# Patient Record
Sex: MALE | Race: WHITE | Employment: OTHER | URBAN - METROPOLITAN AREA
[De-identification: names, ages, dates, MRNs, and addresses within clinical notes are randomized per-mention and may not be internally consistent; named-entity substitution may affect disease eponyms.]

---

## 2018-01-22 ENCOUNTER — HOSPITAL ENCOUNTER (INPATIENT)
Age: 78
LOS: 1 days | Discharge: NURSING, REHAB OR LONG-TERM CARE | DRG: 065 | End: 2018-01-25
Attending: EMERGENCY MEDICINE | Admitting: FAMILY MEDICINE
Payer: MEDICARE

## 2018-01-22 ENCOUNTER — HOSPITAL ENCOUNTER (EMERGENCY)
Age: 78
Discharge: ANOTHER HOSPITAL | End: 2018-01-22
Payer: MEDICARE

## 2018-01-22 ENCOUNTER — APPOINTMENT (OUTPATIENT)
Dept: CT IMAGING | Age: 78
End: 2018-01-22
Payer: MEDICARE

## 2018-01-22 ENCOUNTER — APPOINTMENT (OUTPATIENT)
Dept: MRI IMAGING | Age: 78
DRG: 065 | End: 2018-01-22
Payer: MEDICARE

## 2018-01-22 DIAGNOSIS — R42 DIZZINESS: Primary | ICD-10-CM

## 2018-01-22 DIAGNOSIS — G45.9 TRANSIENT CEREBRAL ISCHEMIA, UNSPECIFIED TYPE: ICD-10-CM

## 2018-01-22 LAB
ABSOLUTE EOS #: 0 K/UL (ref 0–0.4)
ABSOLUTE IMMATURE GRANULOCYTE: ABNORMAL K/UL (ref 0–0.3)
ABSOLUTE LYMPH #: 1.1 K/UL (ref 1–4.8)
ABSOLUTE MONO #: 0.5 K/UL (ref 0.1–1.3)
ALBUMIN SERPL-MCNC: 3.5 G/DL (ref 3.5–5.2)
ALBUMIN/GLOBULIN RATIO: ABNORMAL (ref 1–2.5)
ALP BLD-CCNC: 84 U/L (ref 40–129)
ALT SERPL-CCNC: 18 U/L (ref 5–41)
ANION GAP SERPL CALCULATED.3IONS-SCNC: 12 MMOL/L (ref 9–17)
AST SERPL-CCNC: 22 U/L
BASOPHILS # BLD: 0 % (ref 0–2)
BASOPHILS ABSOLUTE: 0 K/UL (ref 0–0.2)
BILIRUB SERPL-MCNC: 0.52 MG/DL (ref 0.3–1.2)
BILIRUBIN URINE: NEGATIVE
BUN BLDV-MCNC: 17 MG/DL (ref 8–23)
BUN/CREAT BLD: ABNORMAL (ref 9–20)
CALCIUM SERPL-MCNC: 8.7 MG/DL (ref 8.6–10.4)
CHLORIDE BLD-SCNC: 94 MMOL/L (ref 98–107)
CHOLESTEROL/HDL RATIO: 3.5
CHOLESTEROL: 170 MG/DL
CO2: 24 MMOL/L (ref 20–31)
COLOR: YELLOW
COMMENT UA: NORMAL
CREAT SERPL-MCNC: 1.35 MG/DL (ref 0.7–1.2)
DIFFERENTIAL TYPE: ABNORMAL
EKG ATRIAL RATE: 63 BPM
EKG P AXIS: 23 DEGREES
EKG P-R INTERVAL: 214 MS
EKG Q-T INTERVAL: 420 MS
EKG QRS DURATION: 94 MS
EKG QTC CALCULATION (BAZETT): 429 MS
EKG R AXIS: 51 DEGREES
EKG T AXIS: 64 DEGREES
EKG VENTRICULAR RATE: 63 BPM
EOSINOPHILS RELATIVE PERCENT: 1 % (ref 0–4)
GFR AFRICAN AMERICAN: >60 ML/MIN
GFR NON-AFRICAN AMERICAN: 51 ML/MIN
GFR SERPL CREATININE-BSD FRML MDRD: ABNORMAL ML/MIN/{1.73_M2}
GFR SERPL CREATININE-BSD FRML MDRD: ABNORMAL ML/MIN/{1.73_M2}
GLUCOSE BLD-MCNC: 134 MG/DL (ref 70–99)
GLUCOSE URINE: NEGATIVE
HCT VFR BLD CALC: 41.1 % (ref 41–53)
HDLC SERPL-MCNC: 49 MG/DL
HEMOGLOBIN: 13.8 G/DL (ref 13.5–17.5)
IMMATURE GRANULOCYTES: ABNORMAL %
KETONES, URINE: NEGATIVE
LDL CHOLESTEROL: 109 MG/DL (ref 0–130)
LEUKOCYTE ESTERASE, URINE: NEGATIVE
LYMPHOCYTES # BLD: 18 % (ref 24–44)
MCH RBC QN AUTO: 32.9 PG (ref 26–34)
MCHC RBC AUTO-ENTMCNC: 33.6 G/DL (ref 31–37)
MCV RBC AUTO: 97.9 FL (ref 80–100)
MONOCYTES # BLD: 7 % (ref 1–7)
NITRITE, URINE: NEGATIVE
NRBC AUTOMATED: ABNORMAL PER 100 WBC
PDW BLD-RTO: 14.7 % (ref 11.5–14.9)
PH UA: 6.5 (ref 5–8)
PLATELET # BLD: 243 K/UL (ref 150–450)
PLATELET ESTIMATE: ABNORMAL
PMV BLD AUTO: 7.3 FL (ref 6–12)
POTASSIUM SERPL-SCNC: 4.5 MMOL/L (ref 3.7–5.3)
PROTEIN UA: NEGATIVE
RBC # BLD: 4.2 M/UL (ref 4.5–5.9)
RBC # BLD: ABNORMAL 10*6/UL
SEG NEUTROPHILS: 74 % (ref 36–66)
SEGMENTED NEUTROPHILS ABSOLUTE COUNT: 4.9 K/UL (ref 1.3–9.1)
SODIUM BLD-SCNC: 130 MMOL/L (ref 135–144)
SPECIFIC GRAVITY UA: 1.01 (ref 1–1.03)
THYROXINE, FREE: 1.32 NG/DL (ref 0.93–1.7)
TOTAL PROTEIN: 6.9 G/DL (ref 6.4–8.3)
TRIGL SERPL-MCNC: 60 MG/DL
TSH SERPL DL<=0.05 MIU/L-ACNC: 0.58 MIU/L (ref 0.3–5)
TURBIDITY: CLEAR
URINE HGB: NEGATIVE
UROBILINOGEN, URINE: NORMAL
VLDLC SERPL CALC-MCNC: NORMAL MG/DL (ref 1–30)
WBC # BLD: 6.6 K/UL (ref 3.5–11)
WBC # BLD: ABNORMAL 10*3/UL

## 2018-01-22 PROCEDURE — 99999 PR OFFICE/OUTPT VISIT,PROCEDURE ONLY: CPT | Performed by: PSYCHIATRY & NEUROLOGY

## 2018-01-22 PROCEDURE — 93005 ELECTROCARDIOGRAM TRACING: CPT

## 2018-01-22 PROCEDURE — 96372 THER/PROPH/DIAG INJ SC/IM: CPT

## 2018-01-22 PROCEDURE — 80053 COMPREHEN METABOLIC PANEL: CPT

## 2018-01-22 PROCEDURE — 84443 ASSAY THYROID STIM HORMONE: CPT

## 2018-01-22 PROCEDURE — G0378 HOSPITAL OBSERVATION PER HR: HCPCS

## 2018-01-22 PROCEDURE — 2580000003 HC RX 258: Performed by: FAMILY MEDICINE

## 2018-01-22 PROCEDURE — 6360000002 HC RX W HCPCS: Performed by: FAMILY MEDICINE

## 2018-01-22 PROCEDURE — 2580000003 HC RX 258: Performed by: EMERGENCY MEDICINE

## 2018-01-22 PROCEDURE — 94664 DEMO&/EVAL PT USE INHALER: CPT

## 2018-01-22 PROCEDURE — 84439 ASSAY OF FREE THYROXINE: CPT

## 2018-01-22 PROCEDURE — 80047 BASIC METABLC PNL IONIZED CA: CPT

## 2018-01-22 PROCEDURE — 82746 ASSAY OF FOLIC ACID SERUM: CPT

## 2018-01-22 PROCEDURE — 85014 HEMATOCRIT: CPT

## 2018-01-22 PROCEDURE — 82607 VITAMIN B-12: CPT

## 2018-01-22 PROCEDURE — 36415 COLL VENOUS BLD VENIPUNCTURE: CPT

## 2018-01-22 PROCEDURE — 99285 EMERGENCY DEPT VISIT HI MDM: CPT

## 2018-01-22 PROCEDURE — 6370000000 HC RX 637 (ALT 250 FOR IP): Performed by: EMERGENCY MEDICINE

## 2018-01-22 PROCEDURE — 85025 COMPLETE CBC W/AUTO DIFF WBC: CPT

## 2018-01-22 PROCEDURE — 80061 LIPID PANEL: CPT

## 2018-01-22 PROCEDURE — 81003 URINALYSIS AUTO W/O SCOPE: CPT

## 2018-01-22 PROCEDURE — 70450 CT HEAD/BRAIN W/O DYE: CPT

## 2018-01-22 RX ORDER — BUDESONIDE AND FORMOTEROL FUMARATE DIHYDRATE 160; 4.5 UG/1; UG/1
2 AEROSOL RESPIRATORY (INHALATION) 2 TIMES DAILY
Status: DISCONTINUED | OUTPATIENT
Start: 2018-01-22 | End: 2018-01-25 | Stop reason: HOSPADM

## 2018-01-22 RX ORDER — OMEPRAZOLE 20 MG/1
20 CAPSULE, DELAYED RELEASE ORAL DAILY
COMMUNITY

## 2018-01-22 RX ORDER — MELOXICAM 15 MG/1
15 TABLET ORAL DAILY
Status: ON HOLD | COMMUNITY
End: 2018-02-02 | Stop reason: HOSPADM

## 2018-01-22 RX ORDER — GABAPENTIN 300 MG/1
300 CAPSULE ORAL 2 TIMES DAILY
Status: DISCONTINUED | OUTPATIENT
Start: 2018-01-23 | End: 2018-01-25 | Stop reason: HOSPADM

## 2018-01-22 RX ORDER — SODIUM CHLORIDE 0.9 % (FLUSH) 0.9 %
10 SYRINGE (ML) INJECTION PRN
Status: DISCONTINUED | OUTPATIENT
Start: 2018-01-22 | End: 2018-01-22 | Stop reason: SDUPTHER

## 2018-01-22 RX ORDER — BUDESONIDE AND FORMOTEROL FUMARATE DIHYDRATE 160; 4.5 UG/1; UG/1
2 AEROSOL RESPIRATORY (INHALATION) 2 TIMES DAILY
COMMUNITY

## 2018-01-22 RX ORDER — LUTEIN 6 MG
20 TABLET ORAL
Status: ON HOLD | COMMUNITY
End: 2018-02-02 | Stop reason: HOSPADM

## 2018-01-22 RX ORDER — GABAPENTIN 300 MG/1
300 CAPSULE ORAL 3 TIMES DAILY
Status: DISCONTINUED | OUTPATIENT
Start: 2018-01-22 | End: 2018-01-22

## 2018-01-22 RX ORDER — ACETAMINOPHEN 325 MG/1
650 TABLET ORAL EVERY 4 HOURS PRN
Status: DISCONTINUED | OUTPATIENT
Start: 2018-01-22 | End: 2018-01-22 | Stop reason: SDUPTHER

## 2018-01-22 RX ORDER — GABAPENTIN 300 MG/1
300 CAPSULE ORAL 2 TIMES DAILY
COMMUNITY

## 2018-01-22 RX ORDER — SODIUM CHLORIDE 0.9 % (FLUSH) 0.9 %
10 SYRINGE (ML) INJECTION EVERY 12 HOURS SCHEDULED
Status: DISCONTINUED | OUTPATIENT
Start: 2018-01-22 | End: 2018-01-25 | Stop reason: HOSPADM

## 2018-01-22 RX ORDER — CARVEDILOL 12.5 MG/1
12.5 TABLET ORAL 2 TIMES DAILY WITH MEALS
Status: DISCONTINUED | OUTPATIENT
Start: 2018-01-22 | End: 2018-01-22

## 2018-01-22 RX ORDER — 0.9 % SODIUM CHLORIDE 0.9 %
500 INTRAVENOUS SOLUTION INTRAVENOUS ONCE
Status: COMPLETED | OUTPATIENT
Start: 2018-01-22 | End: 2018-01-22

## 2018-01-22 RX ORDER — CARVEDILOL 25 MG/1
25 TABLET ORAL 2 TIMES DAILY WITH MEALS
Status: DISCONTINUED | OUTPATIENT
Start: 2018-01-23 | End: 2018-01-22

## 2018-01-22 RX ORDER — FERROUS SULFATE 325(65) MG
325 TABLET ORAL
COMMUNITY

## 2018-01-22 RX ORDER — CARVEDILOL 12.5 MG/1
25 TABLET ORAL 2 TIMES DAILY WITH MEALS
Status: ON HOLD | COMMUNITY
End: 2018-02-02 | Stop reason: HOSPADM

## 2018-01-22 RX ORDER — MELOXICAM 15 MG/1
15 TABLET ORAL DAILY
Status: DISCONTINUED | OUTPATIENT
Start: 2018-01-22 | End: 2018-01-22

## 2018-01-22 RX ORDER — SODIUM CHLORIDE 0.9 % (FLUSH) 0.9 %
10 SYRINGE (ML) INJECTION EVERY 12 HOURS SCHEDULED
Status: DISCONTINUED | OUTPATIENT
Start: 2018-01-22 | End: 2018-01-22 | Stop reason: SDUPTHER

## 2018-01-22 RX ORDER — SODIUM CHLORIDE 0.9 % (FLUSH) 0.9 %
10 SYRINGE (ML) INJECTION PRN
Status: DISCONTINUED | OUTPATIENT
Start: 2018-01-22 | End: 2018-01-25 | Stop reason: HOSPADM

## 2018-01-22 RX ORDER — ASPIRIN 81 MG/1
324 TABLET, CHEWABLE ORAL ONCE
Status: COMPLETED | OUTPATIENT
Start: 2018-01-22 | End: 2018-01-22

## 2018-01-22 RX ORDER — MELOXICAM 15 MG/1
15 TABLET ORAL DAILY
Status: DISCONTINUED | OUTPATIENT
Start: 2018-01-23 | End: 2018-01-25 | Stop reason: HOSPADM

## 2018-01-22 RX ORDER — ASPIRIN 81 MG/1
81 TABLET, CHEWABLE ORAL DAILY
Status: ON HOLD | COMMUNITY
End: 2018-02-02 | Stop reason: HOSPADM

## 2018-01-22 RX ORDER — PANTOPRAZOLE SODIUM 40 MG/1
40 TABLET, DELAYED RELEASE ORAL
Status: DISCONTINUED | OUTPATIENT
Start: 2018-01-23 | End: 2018-01-22

## 2018-01-22 RX ORDER — LUTEIN 6 MG
20 TABLET ORAL 2 TIMES DAILY
Status: DISCONTINUED | OUTPATIENT
Start: 2018-01-22 | End: 2018-01-22 | Stop reason: ALTCHOICE

## 2018-01-22 RX ORDER — OMEPRAZOLE 20 MG/1
20 CAPSULE, DELAYED RELEASE ORAL DAILY
Status: DISCONTINUED | OUTPATIENT
Start: 2018-01-23 | End: 2018-01-25 | Stop reason: HOSPADM

## 2018-01-22 RX ORDER — CARVEDILOL 25 MG/1
25 TABLET ORAL 2 TIMES DAILY WITH MEALS
Status: DISCONTINUED | OUTPATIENT
Start: 2018-01-22 | End: 2018-01-22

## 2018-01-22 RX ORDER — CARVEDILOL 25 MG/1
25 TABLET ORAL 2 TIMES DAILY WITH MEALS
Status: DISCONTINUED | OUTPATIENT
Start: 2018-01-22 | End: 2018-01-25 | Stop reason: HOSPADM

## 2018-01-22 RX ORDER — ACETAMINOPHEN 325 MG/1
650 TABLET ORAL EVERY 4 HOURS PRN
Status: DISCONTINUED | OUTPATIENT
Start: 2018-01-22 | End: 2018-01-25 | Stop reason: HOSPADM

## 2018-01-22 RX ORDER — SODIUM CHLORIDE 9 MG/ML
INJECTION, SOLUTION INTRAVENOUS CONTINUOUS
Status: DISCONTINUED | OUTPATIENT
Start: 2018-01-22 | End: 2018-01-24

## 2018-01-22 RX ORDER — ONDANSETRON 2 MG/ML
4 INJECTION INTRAMUSCULAR; INTRAVENOUS EVERY 6 HOURS PRN
Status: DISCONTINUED | OUTPATIENT
Start: 2018-01-22 | End: 2018-01-25 | Stop reason: HOSPADM

## 2018-01-22 RX ORDER — FERROUS SULFATE 325(65) MG
325 TABLET ORAL
Status: DISCONTINUED | OUTPATIENT
Start: 2018-01-23 | End: 2018-01-23 | Stop reason: CLARIF

## 2018-01-22 RX ORDER — ASPIRIN 81 MG/1
81 TABLET, CHEWABLE ORAL DAILY
Status: DISCONTINUED | OUTPATIENT
Start: 2018-01-22 | End: 2018-01-23 | Stop reason: CLARIF

## 2018-01-22 RX ADMIN — ENOXAPARIN SODIUM 40 MG: 40 INJECTION SUBCUTANEOUS at 15:39

## 2018-01-22 RX ADMIN — SODIUM CHLORIDE: 9 INJECTION, SOLUTION INTRAVENOUS at 15:28

## 2018-01-22 RX ADMIN — SODIUM CHLORIDE 500 ML: 9 INJECTION, SOLUTION INTRAVENOUS at 11:39

## 2018-01-22 RX ADMIN — ASPIRIN 81 MG 324 MG: 81 TABLET ORAL at 12:55

## 2018-01-22 RX ADMIN — BUDESONIDE AND FORMOTEROL FUMARATE DIHYDRATE 2 PUFF: 160; 4.5 AEROSOL RESPIRATORY (INHALATION) at 21:43

## 2018-01-22 RX ADMIN — CARVEDILOL 25 MG: 25 TABLET ORAL at 21:49

## 2018-01-22 ASSESSMENT — ENCOUNTER SYMPTOMS
COUGH: 0
EYE REDNESS: 0
DIARRHEA: 0
VOMITING: 0
EYE PAIN: 0
EYE DISCHARGE: 0
BACK PAIN: 0
ABDOMINAL PAIN: 0
RHINORRHEA: 0
SHORTNESS OF BREATH: 0

## 2018-01-22 NOTE — PROGRESS NOTES
RN called and spoke with Dr. Jarett Chowdary in regards to home medications. Coreg and gabapentin were adjusted to the doses and frequencies that the patient has been taking at home.

## 2018-01-22 NOTE — ED NOTES
1/22/2018 2:12 PM    Patient: Randal Rodriguez, 76 y.o., male Race:[place]  Patient Address: [place] [address]  Incident Address:  []Same as patient address. [address] [facility]. [address] [facility] [place] [address]   [x] [facility]  [] [facility]  [] [facility] [place]   [] TIFFANY VALENZUELA JR. [facility]  [] Counts include 234 beds at the [facility]  Patient Phone #: [phone]   Insurance: Payor: Gilbert Danielson /  /  /   Nayan Randolph:  []  Yes   [x]  No  Emergency Contact: Extended Emergency Contact Information  Primary Emergency Contact: LynLisadalton  Address: [address] [facility], [address] [place] [address] Phone: [phone]  Relation: Child  Secondary Emergency Contact: RomulosandraGuillermo   [address] Phone: [phone]  Relation: Child  MRN: [id number]  [facility]# [id number]  YOB: 1940  Primary Care Physician: Eduardo Singer DO  Advance Directive: [x] Full Code   []DNR-CC   []DNR-CCA    [facility] Crew: Julianne Gamez - Paramedic,  Charity Gonzales - ALICIA    Pt Transported To:  [] [place] [address]  [] [facility]  [x] [facility]  [] [facility]  [] [facility]  [] [facility]  [facility] [] [place]     Was patient transported to closest facility? [x]Yes  []No (if No specify reason)   []   Patient/family request   []   Divert to specialist       Response Code   [] 2  [x] 3  []   Change  [] 2  [] 3   Transport Code   [x] 2  [] 3  []   Change  [] 2  [] 3     Call Received 4920   Dispatched    Arrived Scene 97 [id number]   At Patient 1932   Decision to [address] [facility]   Scan [phone]   Departed Scene [facility] 1005   Departed Hospital 1026   In Service 1026         Allergies  is allergic to latex. Medications  Prior to Admission medications    Medication Sig Start Date End Date Taking?  Authorizing Provider   aspirin 81 MG chewable tablet Take 81 mg by mouth daily    Historical Provider, MD   carvedilol (COREG) 12.5 MG tablet Take 12.5 mg by mouth 2 times daily (with meals)    Historical Provider, MD   Roflumilast (DALIRESP) 500 MCG tablet Take 500 DO    Hospital Team Alert:   []    Trauma Alert    []    STEMI Alert    []    RACE Alert      Description Of Valuables:     Patient Valuables:   []    With Patient    []    Not Recieved    []    ER / Lab     Call Outcome:   [x]    Transport to Facility    []    Care Transferred to [facility]    []    Cancelled    []    Patient Refusal    []          Cancellations:   []    D. O.A.     []    No Contact    []    Signed Refusal    []    Treat and Release    []    Ginny Cooper  Mobile Stroke Unit    Electronically signed by Marco Antonio aRy RN on 1/22/18 at 2:12 PM     Marco Antonio Ray RN  01/22/18 8153

## 2018-01-22 NOTE — ED PROVIDER NOTES
[address]  eMERGENCY dEPARTMENT eNCOUnter      Pt Name: Donna Lester  MRN: [id number]  Birthdate 1940  Date of evaluation: 1/22/2018  PCP:    Calixto Saleh DO      CHIEF COMPLAINT       Chief Complaint   Patient presents with    Altered Mental Status         HISTORY OF PRESENT ILLNESS   HPI   Donna Lester is a 76 y.o. male who presents For evaluation for a dizzy episode. Patient states that he was walking and started feeling little bit dizzy. He states has happened a few times in the last month. He states right now he feels fine. He was brought in by the stroke team after having a negative CT. Patient again states he is back to his baseline. He feels fine. He states is again happens once in a while. Has no specific complaints otherwise. REVIEW OF SYSTEMS         Review of Systems   Constitutional: Negative for chills and fever. HENT: Negative for congestion and rhinorrhea. Eyes: Negative for pain, discharge and redness. Respiratory: Negative for cough and shortness of breath. Cardiovascular: Negative for chest pain. Gastrointestinal: Negative for abdominal pain, diarrhea and vomiting. Genitourinary: Negative for dysuria and hematuria. Musculoskeletal: Negative for arthralgias, back pain, myalgias, neck pain and neck stiffness. Skin: Negative for rash. Neurological: Positive for dizziness. Negative for light-headedness and headaches. Hematological: Does not bruise/bleed easily.           PAST MEDICAL HISTORY     Past Medical History:   Diagnosis Date    Hypertension        SURGICAL HISTORY       Past Surgical History:   Procedure Laterality Date    ABDOMINAL AORTIC ANEURYSM REPAIR      COLECTOMY         CURRENT MEDICATIONS       Previous Medications    ASPIRIN 81 MG CHEWABLE TABLET    Take 81 mg by mouth daily    BUDESONIDE-FORMOTEROL (SYMBICORT) 160-4.5 MCG/ACT AERO    Inhale 2 puffs into the lungs 2 times daily    CARVEDILOL (COREG) 12.5 MG TABLET    Take 12.5 mg by mouth 2 times daily (with meals)    FERROUS SULFATE 325 (65 FE) MG TABLET    Take 325 mg by mouth daily (with breakfast)    GABAPENTIN (NEURONTIN) 300 MG CAPSULE    Take 300 mg by mouth 3 times daily. LUTEIN 20 MG TABS    Take 20 mg/day by mouth    MELOXICAM (MOBIC) 15 MG TABLET    Take 15 mg by mouth daily    OMEPRAZOLE (PRILOSEC) 20 MG DELAYED RELEASE CAPSULE    Take 20 mg by mouth Daily    ROFLUMILAST (DALIRESP) 500 MCG TABLET    Take 500 mcg by mouth daily    TIOTROPIUM (SPIRIVA) 18 MCG INHALATION CAPSULE    Inhale 18 mcg into the lungs daily       ALLERGIES     Latex    FAMILY HISTORY       No family status information on file. History reviewed. No pertinent family history. SOCIAL HISTORY       Social History     Social History    Marital status:      Spouse name: N/A    Number of children: N/A    Years of education: N/A     Social History Main Topics    Smoking status: Former Smoker    Smokeless tobacco: Former User    Alcohol use Yes    Drug use: No    Sexual activity: Not Asked     Other Topics Concern    None     Social History Narrative    None       PHYSICAL EXAM     INITIAL VITALS:  weight is 218 lb (98.9 kg). His oral temperature is 98.2 °F (36.8 °C). His blood pressure is 131/75 and his pulse is 68. His respiration is 19 and oxygen saturation is 95%. Physical Exam   Constitutional: He is oriented to person, place, and time. He appears well-developed and well-nourished. HENT:   Head: Normocephalic and atraumatic. Mouth/Throat: Oropharynx is clear and moist.   Eyes: Conjunctivae and EOM are normal. Pupils are equal, round, and reactive to light. Right eye exhibits no discharge. Left eye exhibits no discharge. Neck: Normal range of motion. Neck supple. Cardiovascular: Normal rate, regular rhythm, normal heart sounds and intact distal pulses. Pulmonary/Chest: Effort normal and breath sounds normal. No respiratory distress.  He has no

## 2018-01-22 NOTE — ED NOTES
Encouraged pt for urine sample, pt states he doesn't have the urge and doesn't want to try.  Will give pt time to recieve fluids and will try again      Tana Merritt RN  01/22/18 8163

## 2018-01-23 ENCOUNTER — APPOINTMENT (OUTPATIENT)
Dept: MRI IMAGING | Age: 78
DRG: 065 | End: 2018-01-23
Payer: MEDICARE

## 2018-01-23 LAB
ABSOLUTE EOS #: 0 K/UL (ref 0–0.4)
ABSOLUTE IMMATURE GRANULOCYTE: ABNORMAL K/UL (ref 0–0.3)
ABSOLUTE LYMPH #: 1.2 K/UL (ref 1–4.8)
ABSOLUTE MONO #: 0.5 K/UL (ref 0.1–1.3)
ANION GAP SERPL CALCULATED.3IONS-SCNC: 9 MMOL/L (ref 9–17)
ANION GAP: 18 MMOL/L (ref 8–16)
BASOPHILS # BLD: 1 % (ref 0–2)
BASOPHILS ABSOLUTE: 0 K/UL (ref 0–0.2)
BUN BLDV-MCNC: 15 MG/DL (ref 8–23)
BUN/CREAT BLD: ABNORMAL (ref 9–20)
CALCIUM SERPL-MCNC: 8.8 MG/DL (ref 8.6–10.4)
CHLORIDE BLD-SCNC: 98 MMOL/L (ref 98–107)
CO2: 26 MMOL/L (ref 20–31)
CREAT SERPL-MCNC: 0.99 MG/DL (ref 0.7–1.2)
DIFFERENTIAL TYPE: ABNORMAL
EOSINOPHILS RELATIVE PERCENT: 1 % (ref 0–4)
FOLATE: 19.1 NG/ML
GFR AFRICAN AMERICAN: >60 ML/MIN
GFR NON-AFRICAN AMERICAN: >60 ML/MIN
GFR SERPL CREATININE-BSD FRML MDRD: ABNORMAL ML/MIN/{1.73_M2}
GFR SERPL CREATININE-BSD FRML MDRD: ABNORMAL ML/MIN/{1.73_M2}
GLUCOSE BLD-MCNC: 114 MG/DL (ref 70–99)
GLUCOSE BLD-MCNC: 135 MG/DL (ref 74–106)
HCT VFR BLD CALC: 39.5 % (ref 41–53)
HEMOGLOBIN: 13.5 G/DL (ref 13.5–17.5)
IMMATURE GRANULOCYTES: ABNORMAL %
LYMPHOCYTES # BLD: 22 % (ref 24–44)
MCH RBC QN AUTO: 33 PG (ref 26–34)
MCHC RBC AUTO-ENTMCNC: 34.1 G/DL (ref 31–37)
MCV RBC AUTO: 96.9 FL (ref 80–100)
MONOCYTES # BLD: 9 % (ref 1–7)
NRBC AUTOMATED: ABNORMAL PER 100 WBC
PDW BLD-RTO: 14.6 % (ref 11.5–14.9)
PLATELET # BLD: 222 K/UL (ref 150–450)
PLATELET ESTIMATE: ABNORMAL
PMV BLD AUTO: 7.1 FL (ref 6–12)
POC BUN: 19 MG/DL (ref 6–20)
POC CHLORIDE: 95 MMOL/L (ref 98–110)
POC CREATININE: 1.3 MG/DL (ref 0.6–1.4)
POC HEMATOCRIT: 41 % (ref 41–53)
POC HEMOGLOBIN: 13.9 G/DL (ref 13.5–17.5)
POC IONIZED CALCIUM: 1.19 MMOL/L (ref 1.13–1.33)
POC POTASSIUM: 4.5 MMOL/L (ref 3.5–5.1)
POC SODIUM: 133 MMOL/L (ref 136–145)
POC TCO2: 25 MMOL/L (ref 20–31)
POTASSIUM SERPL-SCNC: 4.5 MMOL/L (ref 3.7–5.3)
RBC # BLD: 4.08 M/UL (ref 4.5–5.9)
RBC # BLD: ABNORMAL 10*6/UL
SEG NEUTROPHILS: 67 % (ref 36–66)
SEGMENTED NEUTROPHILS ABSOLUTE COUNT: 3.7 K/UL (ref 1.3–9.1)
SODIUM BLD-SCNC: 133 MMOL/L (ref 135–144)
VITAMIN B-12: 254 PG/ML (ref 232–1245)
WBC # BLD: 5.5 K/UL (ref 3.5–11)
WBC # BLD: ABNORMAL 10*3/UL

## 2018-01-23 PROCEDURE — 97165 OT EVAL LOW COMPLEX 30 MIN: CPT

## 2018-01-23 PROCEDURE — 6360000002 HC RX W HCPCS: Performed by: FAMILY MEDICINE

## 2018-01-23 PROCEDURE — 70551 MRI BRAIN STEM W/O DYE: CPT

## 2018-01-23 PROCEDURE — 96374 THER/PROPH/DIAG INJ IV PUSH: CPT

## 2018-01-23 PROCEDURE — 85025 COMPLETE CBC W/AUTO DIFF WBC: CPT

## 2018-01-23 PROCEDURE — 97161 PT EVAL LOW COMPLEX 20 MIN: CPT

## 2018-01-23 PROCEDURE — 97530 THERAPEUTIC ACTIVITIES: CPT

## 2018-01-23 PROCEDURE — 93880 EXTRACRANIAL BILAT STUDY: CPT

## 2018-01-23 PROCEDURE — G8987 SELF CARE CURRENT STATUS: HCPCS

## 2018-01-23 PROCEDURE — G8978 MOBILITY CURRENT STATUS: HCPCS

## 2018-01-23 PROCEDURE — G0378 HOSPITAL OBSERVATION PER HR: HCPCS

## 2018-01-23 PROCEDURE — 80048 BASIC METABOLIC PNL TOTAL CA: CPT

## 2018-01-23 PROCEDURE — G8979 MOBILITY GOAL STATUS: HCPCS

## 2018-01-23 PROCEDURE — 36415 COLL VENOUS BLD VENIPUNCTURE: CPT

## 2018-01-23 PROCEDURE — 2580000003 HC RX 258: Performed by: FAMILY MEDICINE

## 2018-01-23 PROCEDURE — G8988 SELF CARE GOAL STATUS: HCPCS

## 2018-01-23 PROCEDURE — 96372 THER/PROPH/DIAG INJ SC/IM: CPT

## 2018-01-23 RX ORDER — LORAZEPAM 2 MG/ML
1 INJECTION INTRAMUSCULAR ONCE
Status: COMPLETED | OUTPATIENT
Start: 2018-01-23 | End: 2018-01-23

## 2018-01-23 RX ORDER — FERROUS SULFATE 325(65) MG
325 TABLET ORAL
Status: DISCONTINUED | OUTPATIENT
Start: 2018-01-24 | End: 2018-01-25 | Stop reason: HOSPADM

## 2018-01-23 RX ORDER — ASPIRIN 81 MG/1
81 TABLET ORAL DAILY
Status: DISCONTINUED | OUTPATIENT
Start: 2018-01-23 | End: 2018-01-25 | Stop reason: HOSPADM

## 2018-01-23 RX ADMIN — LORAZEPAM 1 MG: 2 INJECTION INTRAMUSCULAR; INTRAVENOUS at 17:57

## 2018-01-23 RX ADMIN — BUDESONIDE AND FORMOTEROL FUMARATE DIHYDRATE 2 PUFF: 160; 4.5 AEROSOL RESPIRATORY (INHALATION) at 21:42

## 2018-01-23 RX ADMIN — ENOXAPARIN SODIUM 40 MG: 40 INJECTION SUBCUTANEOUS at 10:10

## 2018-01-23 RX ADMIN — BUDESONIDE AND FORMOTEROL FUMARATE DIHYDRATE 2 PUFF: 160; 4.5 AEROSOL RESPIRATORY (INHALATION) at 09:51

## 2018-01-23 RX ADMIN — CARVEDILOL 25 MG: 25 TABLET ORAL at 18:02

## 2018-01-23 RX ADMIN — ASPIRIN 81 MG: 81 TABLET ORAL at 13:19

## 2018-01-23 RX ADMIN — SODIUM CHLORIDE: 9 INJECTION, SOLUTION INTRAVENOUS at 16:15

## 2018-01-23 RX ADMIN — SODIUM CHLORIDE: 9 INJECTION, SOLUTION INTRAVENOUS at 03:30

## 2018-01-23 RX ADMIN — MELOXICAM 15 MG: 15 TABLET ORAL at 09:55

## 2018-01-23 RX ADMIN — CARVEDILOL 25 MG: 25 TABLET ORAL at 09:55

## 2018-01-23 ASSESSMENT — PAIN SCALES - GENERAL: PAINLEVEL_OUTOF10: 7

## 2018-01-23 NOTE — PROGRESS NOTES
chair  Ambulation  Ambulation?: Yes  Ambulation 1  Surface: level tile  Device: Rolling Walker  Assistance: Contact guard assistance  Quality of Gait: steady gait in straight line but LOB with correctin x 2 with turning  Distance: 30ft x 2  Comments: pt gets too close to objects with walker- verbal cues for safety  Stairs/Curb  Stairs?: No     Balance  Sitting - Static: Good  Sitting - Dynamic: Good  Standing - Static: Fair  Comments: LOB backwards with eyse closed and with feet together/eyes open        Assessment   Body structures, Functions, Activity limitations: Decreased functional mobility ; Decreased balance;Decreased safe awareness  Assessment: Impaired mobility due to decreased balance and safety issues  Decision Making: Low Complexity  History: LE neuropathy  Exam: decreased balance  Clinical Presentation: clinical presentation is evolving  REQUIRES PT FOLLOW UP: Yes  PT Equipment Recommendations  Equipment Needed: No     Discharge Recommendations:  Reiseñor 75  Times per week: 7x/wk  Times per day: Daily  Safety Devices  Type of devices: Patient at risk for falls, Left in bed (US in for testing)    G-Code  PT G-Codes  Functional Assessment Tool Used: [place]  Score: 19  Functional Limitation: Mobility: Walking and moving around  Mobility: Walking and Moving Around Current Status (): At least 20 percent but less than 40 percent impaired, limited or restricted  Mobility: Walking and Moving Around Goal Status ():  At least 1 percent but less than 20 percent impaired, limited or restricted          Goals  Short term goals  Time Frame for Short term goals: 3-4 sessions  Short term goal 1: Mod-I transfers  Short term goal 2: Mod-I gait with RW x 150ft  Short term goal 3: Negotiate 4-6 steps mod-I with  rail(pt goes down backwards at home)  Patient Goals   Patient goals : go home       Therapy Time   Individual Concurrent Group Co-treatment   Time In 1000

## 2018-01-23 NOTE — PROGRESS NOTES
[facility]: ALEJA ARCHULETA   Occupational Therapy Evaluation  Date: 18  Patient Name: Kwasi Suarez       Room: 5178/4157-01  MRN: [id number]  Account: [de-identified]   : 1940  (73 y.o.) Gender: male     Referring Practitioner: Dr. Pedro Holland  Diagnosis: TIA    Treatment Diagnosis: Impaired self-care status  Past Medical History:  has a past medical history of Hypertension. Past Surgical History:   has a past surgical history that includes Abdominal aortic aneurysm repair; colectomy; joint replacement; and Carpal tunnel release (Bilateral). Restrictions  Restrictions/Precautions: Fall Risk  Implants present? : Metal implants (L TKA )  Required Braces or Orthoses?: No     Vitals  Temp: 98.1 °F (36.7 °C)  Pulse: 65  Resp: 20  BP: (!) 143/61  Height: 5' 10.5\" (179.1 cm)  Weight: 220 lb 0.3 oz (99.8 kg)  BMI (Calculated): 31.2  Oxygen Therapy  SpO2: 98 %  Pulse Oximeter Device Mode: Continuous  O2 Device: None (Room air)  Blood Pressure Lyin/78  Pulse Lyin PER MINUTE  Blood Pressure Sittin/69  Pulse Sittin PER MINUTE  Blood Pressure Standin/69  Pulse Standin PER MINUTE  Level of Consciousness: Alert    Subjective  Subjective: \"I don't know what is causing this\"  Comments: Pt reports that he has been having increased falls and LOB since November but does not know why.   Overall Orientation Status: Within Functional Limits  Vision  Vision: Impaired  Vision Exceptions: Wears glasses at all times  Hearing  Hearing: Exceptions to [facility]  Hearing Exceptions: Hard of hearing/hearing concerns, No hearing aid  Social/Functional History  Lives With: Son  Type of Home: House  Home Layout: Able to Live on Main level with bedroom/bathroom, Two level  Home Access: Ramped entrance, Stairs to enter with rails  Entrance Stairs - Number of Steps: 4  Bathroom Shower/Tub: Tub/Shower unit, Curtain  Bathroom Toilet: Handicap height  Bathroom Equipment: Grab bars in shower, Shower chair, [employer] bars around toilet, Hand-held shower  Bathroom Accessibility: [facility] accessible  Home Equipment: Rolling walker, Farrukh Cristel, Electric scooter  ADL Assistance: Independent  Homemaking Responsibilities: No (Son does cleaning/laundry; Pt eats out)  Ambulation Assistance: Independent  Transfer Assistance: Independent (uses cane or RW when having a \"bad day\")  Active : Yes (Daytime only)  Occupation: Retired  Additional Comments: son works during the day. pt has had multiple falls in the past 4-5 months    Objective  Vision - Basic Assessment  Patient Visual Report: No visual complaint reported. Cognition  Overall Cognitive Status: WFL   Perception  Overall Perceptual Status: WFL  Sensation  Overall Sensation Status: WFL (BUE)   ADL  Feeding: Independent  Grooming: Setup  UE Bathing: Setup  LE Bathing: Contact guard assistance  UE Dressing: Setup  LE Dressing: Contact guard assistance  Toileting: Contact guard assistance  Additional Comments: CGA for safety with standing tasks. UE Function  LUE Strength  Gross LUE Strength: WFL  L Hand Grasp: 5/5     LUE Tone: Normotonic     LUE AROM (degrees)  LUE AROM : WFL     Left Hand AROM (degrees)  Left Hand AROM: WFL  RUE Strength  Gross RUE Strength: WFL  R Hand Grasp: 5/5      RUE Tone: Normotonic     RUE AROM (degrees)  RUE AROM : WFL     Right Hand AROM (degrees)  Right Hand AROM: WFL    Fine Motor Skills  Coordination  Movements Are Fluid And Coordinated: Yes     Mobility  Sit to Supine: Stand by assistance   Stand Pivot Transfers: Contact guard assistance   Balance  Sitting Balance: Independent  Standing Balance: Contact guard assistance  Standing Balance  Sit to stand: Contact guard assistance  Stand to sit: Contact guard assistance  Comment: Pt tends to sit down hard in chair     Bed mobility  Sit to Supine: Stand by assistance  Comment: Per PT evaluation.      Transfers  Stand Step Transfers: Contact guard assistance  Stand Pivot Transfers: Contact Recommendations: Balance Training, Functional Mobility Training, Endurance Training, Safety Education & Training, Patient/Caregiver Education & Training, Equipment Evaluation, Education, & procurement, Self-Care / ADL    Equipment Recommendations  Equipment Needed:  (TBD)  OT Individual Minutes  Time In: 5267  Time Out: 1739  Minutes: 25    Electronically signed by Ruddy Meléndez on 1/23/18 at 2:32 PM

## 2018-01-23 NOTE — PROGRESS NOTES
Status   01/22/2018 NEGATIVE NEG Final     Bilirubin Urine   Date Value Ref Range Status   01/22/2018 NEGATIVE NEG Final       CULTURES:    Current Rehabilitation Assessments:  PHYSICAL THERAPY:     OCCUPATIONAL THERAPY:     SPEECH:      Assessment:  Active Problems:    TIA (transient ischemic attack)      Plan:  1.  MRI  2. ?inc /plavix??  3. Cont home meds    Katelynn Castellanos MD             1/23/2018, 7:23 AM

## 2018-01-23 NOTE — CONSULTS
Genitourinary: Negative for dysuria and hematuria. Musculoskeletal: Negative for arthralgias, back pain, myalgias, neck pain and neck stiffness. Skin: Negative for rash. Neurological: Positive for dizziness. Negative for light-headedness and headaches. Hematological: Does not bruise/bleed easily.               PHYSICAL EXAM:       /76   Pulse 72   Temp 99.1 °F (37.3 °C) (Oral)   Resp 20   Ht 5' 10.5\" (1.791 m)   Wt 220 lb 0.3 oz (99.8 kg)   SpO2 97%   BMI 31.12 kg/m²       LABS AND IMAGING:       Admission on 01/22/2018   Component Date Value Ref Range Status    WBC 01/22/2018 6.6  3.5 - 11.0 k/uL Final    RBC 01/22/2018 4.20* 4.5 - 5.9 m/uL Final    Hemoglobin 01/22/2018 13.8  13.5 - 17.5 g/dL Final    Hematocrit 01/22/2018 41.1  41 - 53 % Final    MCV 01/22/2018 97.9  80 - 100 fL Final    MCH 01/22/2018 32.9  26 - 34 pg Final    MCHC 01/22/2018 33.6  31 - 37 g/dL Final    RDW 01/22/2018 14.7  11.5 - 14.9 % Final    Platelets 88/24/1693 243  150 - 450 k/uL Final    MPV 01/22/2018 7.3  6.0 - 12.0 fL Final    NRBC Automated 01/22/2018 NOT REPORTED  per 100 WBC Final    Differential Type 01/22/2018 NOT REPORTED   Final    Seg Neutrophils 01/22/2018 74* 36 - 66 % Final    Lymphocytes 01/22/2018 18* 24 - 44 % Final    Monocytes 01/22/2018 7  1 - 7 % Final    Eosinophils % 01/22/2018 1  0 - 4 % Final    Basophils 01/22/2018 0  0 - 2 % Final    Immature Granulocytes 01/22/2018 NOT REPORTED  0 % Final    Segs Absolute 01/22/2018 4.90  1.3 - 9.1 k/uL Final    Absolute Lymph # 01/22/2018 1.10  1.0 - 4.8 k/uL Final    Absolute Mono # 01/22/2018 0.50  0.1 - 1.3 k/uL Final    Absolute Eos # 01/22/2018 0.00  0.0 - 0.4 k/uL Final    Basophils # 01/22/2018 0.00  0.0 - 0.2 k/uL Final    Comment: Performed at [facility]: ALEJA Bateman[address] [place] [address] Marisa.  [address]   [phone]      Absolute Immature Granulocyte 01/22/2018 NOT REPORTED  0.00 - 0.30 k/uL Final    WBC Hgb 01/22/2018 NEGATIVE  NEG Final    pH, UA 01/22/2018 6.5  5.0 - 8.0 Final    Protein, UA 01/22/2018 NEGATIVE  NEG Final    Urobilinogen, Urine 01/22/2018 Normal  NORM Final    Nitrite, Urine 01/22/2018 NEGATIVE  NEG Final    Leukocyte Esterase, Urine 01/22/2018 NEGATIVE  NEG Final    Urinalysis Comments 01/22/2018 Microscopic exam not performed based on chemical results unless requested in   Final    Comment:  original order. Performed at [address] [place] [address]   [phone]      Ventricular Rate 01/22/2018 63  BPM Preliminary    Atrial Rate 01/22/2018 63  BPM Preliminary    P-R Interval 01/22/2018 214  ms Preliminary    QRS Duration 01/22/2018 94  ms Preliminary    Q-T Interval 01/22/2018 420  ms Preliminary    QTc Calculation (Bazett) 01/22/2018 429  ms Preliminary    P Axis 01/22/2018 23  degrees Preliminary    R Axis 01/22/2018 51  degrees Preliminary    T Axis 01/22/2018 64  degrees Preliminary       Radiology Review:  Ct Head Wo Contrast    Result Date: 1/22/2018  EXAMINATION: CT OF THE HEAD WITHOUT CONTRAST  1/22/2018 9:55 am TECHNIQUE: CT of the head was performed without the administration of intravenous contrast. Dose modulation, iterative reconstruction, and/or weight based adjustment of the mA/kV was utilized to reduce the radiation dose to as low as reasonably achievable. COMPARISON: None. HISTORY: ORDERING SYSTEM PROVIDED HISTORY: STROKE Initial evaluation of acute left-sided weakness and confusion. FINDINGS: BRAIN/VENTRICLES: There is no acute intracranial hemorrhage, mass effect or midline shift. No abnormal extra-axial fluid collection. Patchy periventricular and subcortical white matter hypoattenuation may reflect moderate microvascular disease. No CT evidence of acute, territorial infarct. There is no evidence of hydrocephalus. Basal cisterns are patent.  ORBITS: The visualized portion of the orbits demonstrate no acute abnormality. SINUSES: The visualized paranasal sinuses and mastoid air cells demonstrate no acute abnormality. SOFT TISSUES/SKULL:  No acute abnormality of the visualized skull or soft tissues. No acute intracranial abnormality. Findings were discussed with SANTHOSH MCNAMARA at 10:04 am on 1/22/2018. ASSESSMENT AND PLAN:       Patient Active Problem List   Diagnosis    TIA (transient ischemic attack)         Francesca Martinez M.D.   Neurology  1/22/2018  7:47 PM

## 2018-01-23 NOTE — CONSULTS
[address]                   [address] [facility], [address]                                   CONSULTATION    PATIENT NAME: Theresa Souza                     :        1940  MED REC NO:   [id number]                              ROOM:       2124  ACCOUNT NO:   [de-identified]                           ADMIT DATE: 2018  PROVIDER:     Soumya Hernández DATE:  2018    REFERRING PROVIDER:  Kelly Ventura DO    HISTORY OF PRESENT ILLNESS:  This pleasant 69-year-old gentleman reports  that in the afternoon of 2018, he went from the bathroom to his bed,  reaching a pile of clothes on the bed before which, he stumbled and fell  down and his head hit the sweeper. He quickly was able to pull himself up  and go back to sleep. He woke up uneventfully this morning. He went to  have breakfast with his brother. On the way out of the restaurant, he  staggered and started to lose his balance. When he was sat down, somebody  pointed out he may be experiencing a stroke. He was brought to the  hospital.    CT brain was found unremarkable. There was no reported change in vision, speech, swallowing, previous  history of transient lateralized neurological symptoms, tinnitus, bouts of  near or loss of consciousness, or hearing loss. The patient already goes to physical therapy for his balance function. Additional medical problems include peripheral vascular disease and a  history of macular degeneration. Family history is unremarkable for neurological disorders. He used to smoke before. 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 PHYSICAL EXAMINATION:  GENERAL:  A jovial heavy set elderly gentleman is seen lying in bed. He  can get up easily without difficulty, sits without truncal ataxia. HEENT:  Extraocular movements are full without nystagmus. Pupils are 4 mm  reactive to light. Tympanic membranes are normal bilaterally.

## 2018-01-23 NOTE — PROGRESS NOTES
[address] Neurology  Progress Note    Requesting Physician:  Della Jane DO/Nish Marsh DO     CHIEF COMPLAINT:     Follow up for:    Awake jovial and alert. Minimal flattening left labial fold. Minimal truncal ataxia. MRI delayed secondary to need for sedation. Metabolic Workup negative. Carotid scan negative. Further workup based on outcome of MRI scan. Last night status was extensively discussed with his daughter and again with multiple family members today. HISTORY OF PRESENT ILLNESS:                 The patient is a 76 y.o. male who presents with     Allergies:  Latex    Current Medications:   Scheduled Meds:   LORazepam  1 mg Intravenous Once    aspirin  81 mg Oral Daily    [START ON 1/24/2018] ferrous sulfate  325 mg Oral Daily with breakfast    sodium chloride flush  10 mL Intravenous 2 times per day    enoxaparin  40 mg Subcutaneous Daily    gabapentin  300 mg Oral BID    tiotropium  18 mcg Inhalation Daily    budesonide-formoterol  2 puff Inhalation BID    meloxicam  15 mg Oral Daily    omeprazole  20 mg Oral Daily    Roflumilast  500 mcg Oral Daily    carvedilol  25 mg Oral BID WC     Continuous Infusions:   sodium chloride 75 mL/hr at 01/23/18 1615     PRN Meds:.acetaminophen, sodium chloride flush, magnesium hydroxide, ondansetron     REVIEW OF SYSTEMS:             PHYSICAL EXAM:       BP (!) 143/61   Pulse 65   Temp 98.1 °F (36.7 °C) (Oral)   Resp 20   Ht 5' 10.5\" (1.791 m)   Wt 220 lb 0.3 oz (99.8 kg)   SpO2 98%   BMI 31.12 kg/m²   Results for Myke Gray (MRN [id number]) as of 1/23/2018 18:00   Ref. Range 1/22/2018 21:02   Chol/HDL Ratio Latest Ref Range: <5  3.5   Cholesterol Latest Ref Range: <200 mg/dL 170   HDL Cholesterol Latest Ref Range: >40 mg/dL 49   LDL Cholesterol Latest Ref Range: 0 - 130 mg/dL 109   Triglycerides Latest Ref Range: <150 mg/dL 60     Results for Myke Gray (MRN [id number]) as of 1/23/2018 18:00   Ref.  Range Final    BUN 01/22/2018 17  8 - 23 mg/dL Final    CREATININE 01/22/2018 1.35* 0.70 - 1.20 mg/dL Final    Bun/Cre Ratio 01/22/2018 NOT REPORTED  9 - 20 Final    Calcium 01/22/2018 8.7  8.6 - 10.4 mg/dL Final    Sodium 01/22/2018 130* 135 - 144 mmol/L Final    Potassium 01/22/2018 4.5  3.7 - 5.3 mmol/L Final    Chloride 01/22/2018 94* 98 - 107 mmol/L Final    CO2 01/22/2018 24  20 - 31 mmol/L Final    Anion Gap 01/22/2018 12  9 - 17 mmol/L Final    Alkaline Phosphatase 01/22/2018 84  40 - 129 U/L Final    ALT 01/22/2018 18  5 - 41 U/L Final    AST 01/22/2018 22  <40 U/L Final    Total Bilirubin 01/22/2018 0.52  0.3 - 1.2 mg/dL Final    Total Protein 01/22/2018 6.9  6.4 - 8.3 g/dL Final    Alb 01/22/2018 3.5  3.5 - 5.2 g/dL Final    Albumin/Globulin Ratio 01/22/2018 NOT REPORTED  1.0 - 2.5 Final    GFR Non- 01/22/2018 51* >60 mL/min Final    GFR  01/22/2018 >60  >60 mL/min Final    GFR Comment 01/22/2018        Final    Comment: Average GFR for 79or more years old:   76 mL/min/1.73sq m  Chronic Kidney Disease:   <60 mL/min/1.73sq m  Kidney failure:   <15 mL/min/1.73sq m              eGFR calculated using average adult body mass. Additional eGFR calculator   available at:        [email or web address]        Performed at [facility]: ALEJA ARCHULETA [address] [place] [address]   [phone]      GFR Staging 01/22/2018 NOT REPORTED   Final    Color, UA 01/22/2018 YELLOW  YEL Final    Turbidity UA 01/22/2018 CLEAR  CLEAR Final    Glucose, Ur 01/22/2018 NEGATIVE  NEG Final    Bilirubin Urine 01/22/2018 NEGATIVE  NEG Final    Ketones, Urine 01/22/2018 NEGATIVE  NEG Final    Specific Gravity, UA 01/22/2018 1.009  1.000 - 1.030 Final    Urine Hgb 01/22/2018 NEGATIVE  NEG Final    pH, UA 01/22/2018 6.5  5.0 - 8.0 Final    Protein, UA 01/22/2018 NEGATIVE  NEG Final    Urobilinogen, Urine 01/22/2018 Normal  NORM Final    01/23/2018 25  20 - 31 mmol/L Final    Comment:       TESTING PERFORMED BY MOBILE STROKE UNIT  [address] [facility] [place] [address]            Anion Gap 01/23/2018 18* 8 - 16 mmol/L Final    Comment:       TESTING PERFORMED BY MOBILE STROKE UNIT  [address] [facility] [place] [address]            POC Ionized Calcium 01/23/2018 1.19  1.13 - 1.33 mmol/L Final    Comment:       TESTING PERFORMED BY MOBILE STROKE UNIT  [address] [facility] [address]  [facility] [address] [facility] [address] [address] [phone]      POC Hemoglobin 01/23/2018 13.9  13.5 - 17.5 g/dL Final    Comment:       TESTING PERFORMED BY MOBILE STROKE UNIT  [address] [facility] [place] [address]            POC Hematocrit 01/23/2018 41  41 - 53 % Final    Comment:       TESTING PERFORMED BY MOBILE STROKE UNIT  [address] [address] [facility] [address] [address] [phone]         Radiology Review:  Ct Head Wo Contrast    Result Date: 1/22/2018  EXAMINATION: CT OF THE HEAD WITHOUT CONTRAST  1/22/2018 9:55 am TECHNIQUE: CT of the head was performed without the administration of intravenous contrast. Dose modulation, iterative reconstruction, and/or weight based adjustment of the mA/kV was utilized to reduce the radiation dose to as low as reasonably achievable. COMPARISON: None. HISTORY: ORDERING SYSTEM PROVIDED HISTORY: STROKE Initial evaluation of acute left-sided weakness and confusion. FINDINGS: BRAIN/VENTRICLES: There is no acute intracranial hemorrhage, mass effect or midline shift. No abnormal extra-axial fluid collection. Patchy periventricular and subcortical white matter hypoattenuation may reflect moderate microvascular disease. No CT evidence of acute, territorial infarct. There is no evidence of hydrocephalus. Basal cisterns are patent. ORBITS: The visualized portion of the orbits demonstrate no acute abnormality.  SINUSES: The visualized paranasal sinuses and mastoid air cells demonstrate

## 2018-01-23 NOTE — H&P
Resp 20   Ht 5' 10.5\" (1.791 m)   Wt 220 lb 0.3 oz (99.8 kg)   SpO2 97%   BMI 31.12 kg/m²   Height and weight:        Wt Readings from Last 3 Encounters:   01/22/18 220 lb 0.3 oz (99.8 kg)      [unfilled]     Physical Examination:   General appearance - alert, well appearing, and in no distress  Mental status - alert, oriented to person, place, and time  Chest - clear to auscultation, no wheezes, rales or rhonchi, symmetric air entry  Heart - normal rate, regular rhythm, normal S1, S2, no murmurs, rubs, clicks or gallops  Abdomen - soft, nontender, nondistended, no masses or organomegaly  Neurological - alert, oriented, normal speech, no focal findings or movement disorder noted}  Extremities - peripheral pulses normal, no pedal edema, no clubbing or cyanosis  Skin - normal coloration and turgor, no rashes, no suspicious skin lesions noted         Labs:-     CBC:        Recent Labs      01/22/18   1016  01/23/18   0416   WBC  6.6  5.5   HGB  13.8  13.5   PLT  243  222      BMP:         Recent Labs      01/22/18   1016  01/23/18   0416   NA  130*  133*   K  4.5  4.5   CL  94*  98   CO2  24  26   BUN  17  15   CREATININE  1.35*  0.99   GLUCOSE  134*  114*      Glucose:No results for input(s): POCGLU in the last 72 hours. HgbA1C: No results for input(s): LABA1C in the last 72 hours. INR: No results for input(s): INR in the last 72 hours. CARDIAC ENZYMES:No results for input(s): CKTOTAL, CKMB, CKMBINDEX, TROPONINI in the last 72 hours. BNP: No results for input(s): BNP in the last 72 hours.   Lipids:       Recent Labs      01/22/18   2102   CHOL  170   TRIG  60   HDL  49      ABGs: No results found for: PH, PCO2, PO2, HCO3, O2SAT  Thyroid:         Lab Results   Component Value Date     TSH 0.58 01/22/2018      Urinalysis:         Color, UA   Date Value Ref Range Status   01/22/2018 YELLOW YEL Final            pH, UA   Date Value Ref Range Status   01/22/2018 6.5 5.0 - 8.0 Final            Specific Gravity, UA

## 2018-01-23 NOTE — PROGRESS NOTES
Physical Therapy    Facility/Department: [facility] PROGRESSIVE CARE  Initial Assessment    NAME: Marcie Brunner  : 1940  MRN: [id number]    Date of Service: 2018    Patient Diagnosis(es): The primary encounter diagnosis was Dizziness. A diagnosis of Transient cerebral ischemia, unspecified type was also pertinent to this visit. has a past medical history of Hypertension. has a past surgical history that includes Abdominal aortic aneurysm repair; colectomy; joint replacement; and Carpal tunnel release (Bilateral). Restrictions  Restrictions/Precautions  Restrictions/Precautions: Fall Risk          Subjective  General  Family / Caregiver Present: Yes (son)  Follows Commands: Within Functional Limits  General Comment  Comments: pt up in chair upon arrival  Subjective  Subjective: pt pleasant and cooperative  Pain Screening  Patient Currently in Pain: Denies  Vital Signs  Patient Currently in Pain: Denies         Social/Functional History  Social/Functional History  Lives With: Son  Type of Home: House  Home Layout: Able to Live on Main level with bedroom/bathroom, Two level  Home Access: Ramped entrance, Stairs to enter with rails  Entrance Stairs - Number of Steps: 4  Home Equipment: Rolling walker, Rajendra Parrot, Electric scooter  Ambulation Assistance: Independent  Transfer Assistance: Independent (uses cane or RW when having a \"bad day\")  Additional Comments: son works during the day.  pt has had multiple falls in the past 4-5 months  Objective          AROM RLE (degrees)  RLE AROM: WNL  AROM LLE (degrees)  LLE AROM : WNL  Strength RLE  Strength RLE: WNL  Comment: grosslt 4+/5  Strength LLE  Strength LLE: WNL  Comment: grossly 4+/5        Bed mobility  Sit to Supine: Stand by assistance  Transfers  Sit to Stand: Contact guard assistance  Stand to sit: Contact guard assistance  Bed to Chair: Contact guard assistance  Comment: LOB backwards with initial stance when getting up from chair  Ambulation  Ambulation?: Yes  Ambulation 1  Surface: level tile  Device: Rolling Walker  Assistance: Contact guard assistance  Quality of Gait: steady gait in straight line but LOB with correctin x 2 with turning  Distance: 30ft x 2  Comments: pt gets too close to objects with walker- verbal cues for safety  Stairs/Curb  Stairs?: No     Balance  Sitting - Static: Good  Sitting - Dynamic: Good  Standing - Static: Fair  Comments: LOB backwards with eyse closed and with feet together/eyes open        Assessment   Body structures, Functions, Activity limitations: Decreased functional mobility ; Decreased balance;Decreased safe awareness  Assessment: Impaired mobility due to decreased balance and safety issues  Decision Making: Low Complexity  History: LE neuropathy  Exam: decreased balance  Clinical Presentation: clinical presentation is evolving  REQUIRES PT FOLLOW UP: Yes  PT Equipment Recommendations  Equipment Needed: No     Discharge Recommendations:  [address] with assist as needed- if pt is unable to meat goals of mod-I for function he may require skilled care at a facility. Plan   Plan  Times per week: 7x/wk  Times per day: Daily  Current Treatment Recommendations: Safety Education & Training, Balance Training  Safety Devices  Type of devices: Patient at risk for falls, Left in bed (US in for testing)    G-Code  PT G-Codes  Functional Assessment Tool Used: [place]  Score: 19  Functional Limitation: Mobility: Walking and moving around  Mobility: Walking and Moving Around Current Status (): At least 20 percent but less than 40 percent impaired, limited or restricted  Mobility: Walking and Moving Around Goal Status ():  At least 1 percent but less than 20 percent impaired, limited or restricted       Goals  Short term goals  Time Frame for Short term goals: 3-4 sessions  Short term goal 1: Mod-I transfers  Short term goal 2: Mod-I gait with RW x 150ft  Short term goal 3: Negotiate 4-6 steps mod-I with  rail(pt goes down backwards at home)  Patient Goals   Patient goals : go home       Therapy Time   Individual Concurrent Group Co-treatment   Time In 1000         Time Out 1025         Minutes [id number] Liset Ferreira, PT

## 2018-01-24 LAB
LV EF: 55 %
LVEF MODALITY: NORMAL
SEDIMENTATION RATE, ERYTHROCYTE: 18 MM (ref 0–15)

## 2018-01-24 PROCEDURE — 97110 THERAPEUTIC EXERCISES: CPT

## 2018-01-24 PROCEDURE — 6360000002 HC RX W HCPCS: Performed by: FAMILY MEDICINE

## 2018-01-24 PROCEDURE — 85651 RBC SED RATE NONAUTOMATED: CPT

## 2018-01-24 PROCEDURE — 97116 GAIT TRAINING THERAPY: CPT

## 2018-01-24 PROCEDURE — 93306 TTE W/DOPPLER COMPLETE: CPT

## 2018-01-24 PROCEDURE — 96372 THER/PROPH/DIAG INJ SC/IM: CPT

## 2018-01-24 PROCEDURE — 99222 1ST HOSP IP/OBS MODERATE 55: CPT | Performed by: PHYSICAL MEDICINE & REHABILITATION

## 2018-01-24 PROCEDURE — 2060000000 HC ICU INTERMEDIATE R&B

## 2018-01-24 PROCEDURE — 2580000003 HC RX 258: Performed by: FAMILY MEDICINE

## 2018-01-24 PROCEDURE — 6370000000 HC RX 637 (ALT 250 FOR IP): Performed by: PSYCHIATRY & NEUROLOGY

## 2018-01-24 PROCEDURE — 36415 COLL VENOUS BLD VENIPUNCTURE: CPT

## 2018-01-24 RX ORDER — ATORVASTATIN CALCIUM 40 MG/1
40 TABLET, FILM COATED ORAL NIGHTLY
Status: DISCONTINUED | OUTPATIENT
Start: 2018-01-24 | End: 2018-01-25 | Stop reason: HOSPADM

## 2018-01-24 RX ORDER — CLOPIDOGREL BISULFATE 75 MG/1
75 TABLET ORAL DAILY
Status: DISCONTINUED | OUTPATIENT
Start: 2018-01-24 | End: 2018-01-25 | Stop reason: HOSPADM

## 2018-01-24 RX ADMIN — ATORVASTATIN CALCIUM 40 MG: 40 TABLET, FILM COATED ORAL at 20:27

## 2018-01-24 RX ADMIN — Medication 325 MG: at 09:44

## 2018-01-24 RX ADMIN — CARVEDILOL 25 MG: 25 TABLET ORAL at 09:30

## 2018-01-24 RX ADMIN — ENOXAPARIN SODIUM 40 MG: 40 INJECTION SUBCUTANEOUS at 09:31

## 2018-01-24 RX ADMIN — OMEPRAZOLE 20 MG: 20 CAPSULE, DELAYED RELEASE ORAL at 05:54

## 2018-01-24 RX ADMIN — GABAPENTIN 300 MG: 300 CAPSULE ORAL at 20:27

## 2018-01-24 RX ADMIN — Medication 10 ML: at 20:32

## 2018-01-24 RX ADMIN — CLOPIDOGREL BISULFATE 75 MG: 75 TABLET ORAL at 14:29

## 2018-01-24 RX ADMIN — BUDESONIDE AND FORMOTEROL FUMARATE DIHYDRATE 2 PUFF: 160; 4.5 AEROSOL RESPIRATORY (INHALATION) at 09:30

## 2018-01-24 RX ADMIN — ASPIRIN 81 MG: 81 TABLET ORAL at 09:29

## 2018-01-24 RX ADMIN — CARVEDILOL 25 MG: 25 TABLET ORAL at 18:32

## 2018-01-24 RX ADMIN — BUDESONIDE AND FORMOTEROL FUMARATE DIHYDRATE 2 PUFF: 160; 4.5 AEROSOL RESPIRATORY (INHALATION) at 20:28

## 2018-01-24 RX ADMIN — GABAPENTIN 300 MG: 300 CAPSULE ORAL at 09:44

## 2018-01-24 RX ADMIN — MELOXICAM 15 MG: 15 TABLET ORAL at 09:26

## 2018-01-24 ASSESSMENT — PAIN SCALES - GENERAL: PAINLEVEL_OUTOF10: 0

## 2018-01-24 NOTE — CONSULTS
sensation. Motor: Muscle tone and bulk are normal bilaterally. No pronator drift. - mild dysmetria      Plantar reflex is down going bilaterally. .      Diagnostics:  CBC   Lab Results   Component Value Date    WBC 5.5 01/23/2018    RBC 4.08 01/23/2018    HGB 13.5 01/23/2018    HCT 39.5 01/23/2018    MCV 96.9 01/23/2018    RDW 14.6 01/23/2018     01/23/2018     BMP    Lab Results   Component Value Date     01/23/2018    K 4.5 01/23/2018    CL 98 01/23/2018    CO2 26 01/23/2018    BUN 15 01/23/2018     Uric Acid  No components found for: URIC  VITAMIN B12 No components found for: B12  PT/INR  No results found for: PTINR    Radiology:     Impression: Mr. Vivian Grace is a 76 y.o. right handed male with CVA  1. Ambulatory and ADL dysfunction secondary to CVA with acute medial right temporal and right occipital lobe infarcts  2. CVAon statin and Plavix and aspirin  3. History balance dysfunctionwas in physical therapy  4. HypertensionCoreg  5. Questionable meloxicam, Neurontin  6. DVT prophylaxislovenox  7. COPDSymbicort, Spiriva,daliresp  8. Hyponatremia  9. Recommendations:  1. Diagnosis: CVA  2. Therapyhas PT needsposterior loss of balance, fluctuating jillian, wide base of support,is contact-guard and OT is contact-guardhas balance difficulties,  3. Medical necessity: Monitor CVA extension, balance, falls, bleed,. Electrolyte abnormalities , pulmonary  4. Support family / friends only short time,  5. Rehab-would benefit from short term inpt rehab to work on trans, bal, adl , steps, amb, etc with home goal mod ind level in 7-10 days    Discussed with pt, family and nsg    It was my pleasure to evaluate Vivian Grace today. Please call with questions. Kiersten Blunt. Armani Martinez MD          This note is created with the assistance of a speech recognition program.  While intending to generate a document that actually reflects the content of the visit, the document can still have some errors including those of syntax and sound a like substitutions which may escape proof reading.   In such instances, actual meaning can be extrapolated by contextual diversion

## 2018-01-24 NOTE — PROGRESS NOTES
oriented to person, place, and time  Chest - clear to auscultation, no wheezes, rales or rhonchi, symmetric air entry  Heart - normal rate, regular rhythm, normal S1, S2, no murmurs, rubs, clicks or gallops  Abdomen - soft, nontender, nondistended, no masses or organomegaly    Labs:-    CBC:   Recent Labs      01/22/18   1016  01/23/18   0416   WBC  6.6  5.5   HGB  13.8  13.5   PLT  243  222     BMP:  Recent Labs      01/22/18   1006  01/22/18   1016  01/23/18   0416   NA   --   130*  133*   K   --   4.5  4.5   CL   --   94*  98   CO2   --   24  26   BUN   --   17  15   CREATININE  1.3  1.35*  0.99   GLUCOSE   --   134*  114*     Glucose:  Recent Labs      01/22/18   1006   POCGLU  135*     HgbA1C: No results for input(s): LABA1C in the last 72 hours. INR: No results for input(s): INR in the last 72 hours. CARDIAC ENZYMES:No results for input(s): CKTOTAL, CKMB, CKMBINDEX, TROPONINI in the last 72 hours. BNP: No results for input(s): BNP in the last 72 hours.   Lipids: Recent Labs      01/22/18   2102   CHOL  170   TRIG  60   HDL  49     ABGs: No results found for: PH, PCO2, PO2, HCO3, O2SAT  Thyroid:   Lab Results   Component Value Date    TSH 0.58 01/22/2018      Urinalysis: Color, UA   Date Value Ref Range Status   01/22/2018 YELLOW YEL Final     pH, UA   Date Value Ref Range Status   01/22/2018 6.5 5.0 - 8.0 Final     Specific Gravity, UA   Date Value Ref Range Status   01/22/2018 1.009 1.000 - 1.030 Final     Protein, UA   Date Value Ref Range Status   01/22/2018 NEGATIVE NEG Final     Nitrite, Urine   Date Value Ref Range Status   01/22/2018 NEGATIVE NEG Final     Leukocyte Esterase, Urine   Date Value Ref Range Status   01/22/2018 NEGATIVE NEG Final     Glucose, Ur   Date Value Ref Range Status   01/22/2018 NEGATIVE NEG Final     Bilirubin Urine   Date Value Ref Range Status   01/22/2018 NEGATIVE NEG Final       CULTURES:    Current Rehabilitation Assessments:  PHYSICAL THERAPY:     OCCUPATIONAL THERAPY:

## 2018-01-24 NOTE — PLAN OF CARE
Problem: Falls - Risk of  Goal: Absence of falls  Outcome: Ongoing  No falls this shift, gait weak and unsteady. No injury this shift.     Problem: Musculor/Skeletal Functional Status  Goal: Highest potential functional level  Outcome: Ongoing  Patient up with assist.
(4) no limitation

## 2018-01-24 NOTE — PROGRESS NOTES
[place] [id number] of Neurology  Progress Note    Requesting Physician:  Tiana Colon DO/Nish Marsh DO     CHIEF COMPLAINT:     Follow up for:    Medically stable. No change in cardiovascular status. MRI scan of the brain shows 2 right hemispheric infarctions. Echo ordered. Minimal carotid disease. Elevated lipid panel, will start on Lipitor. No reported change in vision or speech or swallowing. Truncal ataxia while sitting. Positive positive Romberg's test eyes open. Minimal left-sided weakness 4/5. We'll check sedimentation rate. Add Plavix. Side effects discussed. Multiple family members in the room discussed reviewed and upraised. We'll consult rehabilitation. Outpatient neurological follow-up for weeks post rehab.   Please call as needed thank you    HISTORY OF PRESENT ILLNESS:                 The patient is a 76 y.o. male who presents with     Allergies:  Latex    Current Medications:   Scheduled Meds:   aspirin  81 mg Oral Daily    ferrous sulfate  325 mg Oral Daily with breakfast    sodium chloride flush  10 mL Intravenous 2 times per day    enoxaparin  40 mg Subcutaneous Daily    gabapentin  300 mg Oral BID    tiotropium  18 mcg Inhalation Daily    budesonide-formoterol  2 puff Inhalation BID    meloxicam  15 mg Oral Daily    omeprazole  20 mg Oral Daily    Roflumilast  500 mcg Oral Daily    carvedilol  25 mg Oral BID WC     Continuous Infusions:   sodium chloride 75 mL/hr at 01/24/18 0505     PRN Meds:.acetaminophen, sodium chloride flush, magnesium hydroxide, ondansetron     REVIEW OF SYSTEMS:             PHYSICAL EXAM:       BP (!) 147/59   Pulse 61   Temp 97.7 °F (36.5 °C) (Oral)   Resp 16   Ht 5' 10.5\" (1.791 m)   Wt 220 lb 0.3 oz (99.8 kg)   SpO2 98%   BMI 31.12 kg/m²       LABS AND IMAGING:       Admission on 01/22/2018   Component Date Value Ref Range Status    WBC 01/22/2018 6.6  3.5 - 11.0 k/uL Final    RBC 01/22/2018 4.20* 4.5 - 5.9 m/uL Final  Hemoglobin 01/22/2018 13.8  13.5 - 17.5 g/dL Final    Hematocrit 01/22/2018 41.1  41 - 53 % Final    MCV 01/22/2018 97.9  80 - 100 fL Final    MCH 01/22/2018 32.9  26 - 34 pg Final    MCHC 01/22/2018 33.6  31 - 37 g/dL Final    RDW 01/22/2018 14.7  11.5 - 14.9 % Final    Platelets 06/92/7086 243  150 - 450 k/uL Final    MPV 01/22/2018 7.3  6.0 - 12.0 fL Final    NRBC Automated 01/22/2018 NOT REPORTED  per 100 WBC Final    Differential Type 01/22/2018 NOT REPORTED   Final    Seg Neutrophils 01/22/2018 74* 36 - 66 % Final    Lymphocytes 01/22/2018 18* 24 - 44 % Final    Monocytes 01/22/2018 7  1 - 7 % Final    Eosinophils % 01/22/2018 1  0 - 4 % Final    Basophils 01/22/2018 0  0 - 2 % Final    Immature Granulocytes 01/22/2018 NOT REPORTED  0 % Final    Segs Absolute 01/22/2018 4.90  1.3 - 9.1 k/uL Final    Absolute Lymph # 01/22/2018 1.10  1.0 - 4.8 k/uL Final    Absolute Mono # 01/22/2018 0.50  0.1 - 1.3 k/uL Final    Absolute Eos # 01/22/2018 0.00  0.0 - 0.4 k/uL Final    Basophils # 01/22/2018 0.00  0.0 - 0.2 k/uL Final    Comment: Performed at [facility]: ALEJA ARCHULETA [address] [place] [address]   [phone]      Absolute Immature Granulocyte 01/22/2018 NOT REPORTED  0.00 - 0.30 k/uL Final    WBC Morphology 01/22/2018 NOT REPORTED   Final    RBC Morphology 01/22/2018 NOT REPORTED   Final    Platelet Estimate 86/70/8451 NOT REPORTED   Final    Glucose 01/22/2018 134* 70 - 99 mg/dL Final    BUN 01/22/2018 17  8 - 23 mg/dL Final    CREATININE 01/22/2018 1.35* 0.70 - 1.20 mg/dL Final    Bun/Cre Ratio 01/22/2018 NOT REPORTED  9 - 20 Final    Calcium 01/22/2018 8.7  8.6 - 10.4 mg/dL Final    Sodium 01/22/2018 130* 135 - 144 mmol/L Final    Potassium 01/22/2018 4.5  3.7 - 5.3 mmol/L Final    Chloride 01/22/2018 94* 98 - 107 mmol/L Final    CO2 01/22/2018 24  20 - 31 mmol/L Final    Anion Gap 01/22/2018 12  9 - 17 mmol/L Final    Alkaline Phosphatase 01/22/2018 84 01/22/2018 3.5  <5 Final    Triglycerides 01/22/2018 60  <150 mg/dL Final    Comment:    Triglyceride Guidelines:     <150   Desirable   150-199  Borderline   200-499  High     >499   Very high   Based on AHA Guidelines for fasting triglyceride, October 2012. Performed at [address]  [address]   [phone]      VLDL 01/22/2018 NOT REPORTED  1 - 30 mg/dL Final   Admission on 01/22/2018, Discharged on 01/22/2018   Component Date Value Ref Range Status    POC Glucose 01/23/2018 135* 74 - 106 mg/dL Final    Comment:       TESTING PERFORMED BY [facility]  [address] [place] [address]            POC BUN 01/23/2018 19  6 - 20 mg/dL Final    Comment:       TESTING PERFORMED BY [facility]  [address] [place] [address]            POC Creatinine 01/23/2018 1.3  0.6 - 1.4 mg/dL Final    Comment:       TESTING PERFORMED BY [facility]  [address] [place] [address]            POC Sodium 01/23/2018 133* 136 - 145 mmol/L Final    Comment:       TESTING PERFORMED BY [facility]  [address] [place] [address]            POC Potassium 01/23/2018 4.5  3.5 - 5.1 mmol/L Final    Comment:       TESTING PERFORMED BY [facility]  [address] [place] [address]            POC Chloride 01/23/2018 95* 98 - 110 mmol/L Final    Comment:       TESTING PERFORMED BY [facility] UN[facility]  [address] [place] [address]            POC TCO2 01/23/2018 25  20 - 31 mmol/L Final    Comment:       TESTING PERFORMED BY [facility]  [address] [place] [id number]            Anion Gap 01/23/2018 18* 8 - 16 mmol/L Final    Comment:       TESTING PERFORMED BY [facility]  [address] [place] [address]            POC Ionized Calcium 01/23/2018 1.19  1.13 - 1.33 mmol/L Final    Comment:       TESTING PERFORMED BY [facility] UNIT  [address] [address] [address]        [facility] [id number] [place] [address] [phone]      POC Hemoglobin 01/23/2018 13.9  13.5 - 17.5 g/dL Final    Comment:       TESTING PERFORMED BY MOBILE STROKE UNIT  [address]  [facility] [address]            POC Hematocrit 01/23/2018 41  41 - 53 % Final    Comment:       TESTING PERFORMED BY [facility]BI[facility] UNIT  [address] [address] [address] [address] [address] [phone]         Radiology Review:  Ct Head Wo Contrast    Result Date: 1/24/2018  EXAMINATION: CT OF THE HEAD WITHOUT CONTRAST  1/22/2018 9:55 am TECHNIQUE: CT of the head was performed without the administration of intravenous contrast. Dose modulation, iterative reconstruction, and/or weight based adjustment of the mA/kV was utilized to reduce the radiation dose to as low as reasonably achievable. COMPARISON: None. HISTORY: ORDERING SYSTEM PROVIDED HISTORY: STROKE Initial evaluation of acute left-sided weakness and confusion. FINDINGS: BRAIN/VENTRICLES: There is no acute intracranial hemorrhage, mass effect or midline shift. No abnormal extra-axial fluid collection. Patchy periventricular and subcortical white matter hypoattenuation may reflect moderate microvascular disease. No CT evidence of acute, territorial infarct. There is no evidence of hydrocephalus. Basal cisterns are patent. ORBITS: The visualized portion of the orbits demonstrate no acute abnormality. SINUSES: The visualized paranasal sinuses and mastoid air cells demonstrate no acute abnormality. SOFT TISSUES/SKULL:  No acute abnormality of the visualized skull or soft tissues. No acute intracranial abnormality. Findings were discussed with SANTHOSH MCNAMARA at 10:04 am on 1/22/2018. Vl Dup Carotid Bilateral    Result Date: 1/23/2018     Conclusions   Summary   Simultaneous real time imaging utilizing B-Mode, color flow doppler and  spectral waveform analysis was performed on the bilateral extracerebral  vascular system. The study demonstrates:   Right:   The internal carotid artery is normal.  The vertebral artery is patent with antegrade flow. Left:  The internal carotid artery is normal.  The vertebral artery is patent with antegrade flow. Signature   ----------------------------------------------------------------  Electronically signed by Leonard Maria RVT(Sonographer) on  01/23/2018 12:10 PM  ----------------------------------------------------------------   ----------------------------------------------------------------  Electronically signed by Radha Smart(Interpreting  physician) on 01/23/2018 12:37 PM  ----------------------------------------------------------------      Mri Brain Wo Contrast    Result Date: 1/23/2018  EXAMINATION: MRI OF THE BRAIN WITHOUT CONTRAST  1/23/2018 7:02 pm TECHNIQUE: Multiplanar multisequence MRI of the brain was performed without the administration of intravenous contrast. COMPARISON: None. HISTORY: ORDERING SYSTEM PROVIDED HISTORY: Cerebellar infarction TECHNOLOGIST PROVIDED HISTORY: Ordering Physician Provided Reason for Exam: CEREBELLAR INFARCTION Additional signs and symptoms: EPISODES OF DIZZINESS & OFF BALANCE SINCE ABOUT October WITH MOST RECENT BEING YESTERDAY ; PT STATES HE HAS HAD SOME FALLS INCLUDING YESTERDAYS EPISODE FINDINGS: INTRACRANIAL STRUCTURES/VENTRICLES: There are acute infarcts within the right posterior hippocampus and right occipital lobe. This is superimposed on chronic white matter microvascular ischemic disease characterized by confluent periventricular white matter signal abnormality. No bleed or shift is identified. ORBITS: The visualized portion of the orbits demonstrate no acute abnormality. SINUSES: The visualized paranasal sinuses and mastoid air cells are well aerated. BONES/SOFT TISSUES: The bone marrow signal intensity appears normal. The soft tissues demonstrate no acute abnormality. Acute infarcts within the medial right temporal lobe and right occipital lobe.  The findings were

## 2018-01-24 NOTE — PROGRESS NOTES
[facility]   Physical Therapy Progress Note    Date: 18  Patient Name: Xochilt Steiner       Room: [facility]  MRN: [id number]   Account: [de-identified]   : 1940  (76 y.o.)   Gender: male     Referring Practitioner: Dr. Dasia aguayo  Diagnosis: TIA  Restrictions/Precautions: Fall Risk  Implants present? : Metal implants (L TKA )   Past Medical History:  has a past medical history of Hypertension. Past Surgical History:   has a past surgical history that includes Abdominal aortic aneurysm repair; colectomy; joint replacement; and Carpal tunnel release (Bilateral). Overall Orientation Status: Within Normal Limits  Restrictions/Precautions  Restrictions/Precautions: Fall Risk  Required Braces or Orthoses?: No  Implants present? : Metal implants (L TKA )    Subjective: Pt reports feeling good; pt states no pain or complaint  Comments: Pt is sitting up in chair upon arrival. Pt's eldest son reports helping pt out of bed and into bedside chair. Pt is motivated and agrees balance needs improved. Vital Signs  Patient Currently in Pain: Denies           Patient Observation  Observations:  Motivated and cooperative       Bed Mobility:   Bed Mobility  Rolling: Unable to assess (pt is sitting in bedside chair)  Comment: pt is sitting in bedside chair, pt's son reports helping pt out of bed and into chair    Transfers:  Sit to Stand: Contact guard assistance (Posterior LOB self corrected upon standing)  Stand to sit: Contact guard assistance  Bed to Chair: Contact guard assistance              Ambulation 1  Surface: level tile  Device: Rolling Walker  Assistance: Contact guard assistance  Quality of Gait: Fluctuating jillian, wide PJ, minimal knee and dorsiflexion, good posture, sway to left with LOB on turns to left  Distance: 40ft, 30ft, 10ft  Comments: fatigues quickly, 2 turns with 2 LOB, vc's to stay inside RW, vc's for heel/toe gait        Stairs/Curb  Stairs?: No

## 2018-01-25 ENCOUNTER — HOSPITAL ENCOUNTER (INPATIENT)
Age: 78
LOS: 9 days | Discharge: HOME | DRG: 092 | End: 2018-02-03
Attending: PHYSICAL MEDICINE & REHABILITATION | Admitting: PHYSICAL MEDICINE & REHABILITATION
Payer: MEDICARE

## 2018-01-25 VITALS
TEMPERATURE: 98.1 F | BODY MASS INDEX: 30.12 KG/M2 | DIASTOLIC BLOOD PRESSURE: 64 MMHG | WEIGHT: 215.17 LBS | HEART RATE: 62 BPM | RESPIRATION RATE: 18 BRPM | HEIGHT: 71 IN | SYSTOLIC BLOOD PRESSURE: 151 MMHG | OXYGEN SATURATION: 100 %

## 2018-01-25 PROBLEM — I63.511 CEREBROVASCULAR ACCIDENT (CVA) DUE TO OCCLUSION OF RIGHT MIDDLE CEREBRAL ARTERY (HCC): Status: ACTIVE | Noted: 2018-01-25

## 2018-01-25 PROBLEM — I63.9 ACUTE CVA (CEREBROVASCULAR ACCIDENT) (HCC): Status: ACTIVE | Noted: 2018-01-25

## 2018-01-25 PROBLEM — I10 ESSENTIAL HYPERTENSION: Status: ACTIVE | Noted: 2018-01-25

## 2018-01-25 PROCEDURE — 6370000000 HC RX 637 (ALT 250 FOR IP): Performed by: FAMILY MEDICINE

## 2018-01-25 PROCEDURE — G8978 MOBILITY CURRENT STATUS: HCPCS

## 2018-01-25 PROCEDURE — 6370000000 HC RX 637 (ALT 250 FOR IP): Performed by: PSYCHIATRY & NEUROLOGY

## 2018-01-25 PROCEDURE — 97165 OT EVAL LOW COMPLEX 30 MIN: CPT

## 2018-01-25 PROCEDURE — 1180000000 HC REHAB R&B

## 2018-01-25 PROCEDURE — 2580000003 HC RX 258: Performed by: FAMILY MEDICINE

## 2018-01-25 PROCEDURE — 97162 PT EVAL MOD COMPLEX 30 MIN: CPT

## 2018-01-25 PROCEDURE — 6360000002 HC RX W HCPCS: Performed by: FAMILY MEDICINE

## 2018-01-25 PROCEDURE — 92523 SPEECH SOUND LANG COMPREHEN: CPT

## 2018-01-25 PROCEDURE — G8979 MOBILITY GOAL STATUS: HCPCS

## 2018-01-25 PROCEDURE — 99233 SBSQ HOSP IP/OBS HIGH 50: CPT | Performed by: PHYSICAL MEDICINE & REHABILITATION

## 2018-01-25 RX ORDER — ASPIRIN 81 MG/1
81 TABLET ORAL DAILY
Status: CANCELLED | OUTPATIENT
Start: 2018-01-25

## 2018-01-25 RX ORDER — SODIUM CHLORIDE 0.9 % (FLUSH) 0.9 %
10 SYRINGE (ML) INJECTION PRN
Status: CANCELLED | OUTPATIENT
Start: 2018-01-25

## 2018-01-25 RX ORDER — SODIUM CHLORIDE 0.9 % (FLUSH) 0.9 %
10 SYRINGE (ML) INJECTION EVERY 12 HOURS SCHEDULED
Status: CANCELLED | OUTPATIENT
Start: 2018-01-25

## 2018-01-25 RX ORDER — CLOPIDOGREL BISULFATE 75 MG/1
75 TABLET ORAL DAILY
Status: CANCELLED | OUTPATIENT
Start: 2018-01-25

## 2018-01-25 RX ORDER — PANTOPRAZOLE SODIUM 40 MG/1
40 TABLET, DELAYED RELEASE ORAL
Status: DISCONTINUED | OUTPATIENT
Start: 2018-01-26 | End: 2018-02-03 | Stop reason: HOSPADM

## 2018-01-25 RX ORDER — ATORVASTATIN CALCIUM 40 MG/1
40 TABLET, FILM COATED ORAL NIGHTLY
Status: DISCONTINUED | OUTPATIENT
Start: 2018-01-25 | End: 2018-02-03 | Stop reason: HOSPADM

## 2018-01-25 RX ORDER — OMEPRAZOLE 20 MG/1
20 CAPSULE, DELAYED RELEASE ORAL DAILY
Status: DISCONTINUED | OUTPATIENT
Start: 2018-01-26 | End: 2018-01-25

## 2018-01-25 RX ORDER — ACETAMINOPHEN 325 MG/1
650 TABLET ORAL EVERY 4 HOURS PRN
Status: DISCONTINUED | OUTPATIENT
Start: 2018-01-25 | End: 2018-02-03 | Stop reason: HOSPADM

## 2018-01-25 RX ORDER — CARVEDILOL 25 MG/1
25 TABLET ORAL 2 TIMES DAILY WITH MEALS
Status: CANCELLED | OUTPATIENT
Start: 2018-01-25

## 2018-01-25 RX ORDER — ONDANSETRON 2 MG/ML
4 INJECTION INTRAMUSCULAR; INTRAVENOUS EVERY 6 HOURS PRN
Status: CANCELLED | OUTPATIENT
Start: 2018-01-25

## 2018-01-25 RX ORDER — ONDANSETRON 2 MG/ML
4 INJECTION INTRAMUSCULAR; INTRAVENOUS EVERY 6 HOURS PRN
Status: DISCONTINUED | OUTPATIENT
Start: 2018-01-25 | End: 2018-01-25

## 2018-01-25 RX ORDER — GABAPENTIN 300 MG/1
300 CAPSULE ORAL 2 TIMES DAILY
Status: DISCONTINUED | OUTPATIENT
Start: 2018-01-25 | End: 2018-02-03 | Stop reason: HOSPADM

## 2018-01-25 RX ORDER — LOSARTAN POTASSIUM 25 MG/1
25 TABLET ORAL 2 TIMES DAILY
Status: DISCONTINUED | OUTPATIENT
Start: 2018-01-25 | End: 2018-01-25 | Stop reason: HOSPADM

## 2018-01-25 RX ORDER — ASPIRIN 81 MG/1
81 TABLET ORAL DAILY
Status: DISCONTINUED | OUTPATIENT
Start: 2018-01-26 | End: 2018-02-03 | Stop reason: HOSPADM

## 2018-01-25 RX ORDER — ASPIRIN 81 MG/1
81 TABLET ORAL DAILY
Status: DISCONTINUED | OUTPATIENT
Start: 2018-01-26 | End: 2018-01-25

## 2018-01-25 RX ORDER — GABAPENTIN 300 MG/1
300 CAPSULE ORAL 2 TIMES DAILY
Status: CANCELLED | OUTPATIENT
Start: 2018-01-25

## 2018-01-25 RX ORDER — MELOXICAM 15 MG/1
15 TABLET ORAL DAILY
Status: DISCONTINUED | OUTPATIENT
Start: 2018-01-26 | End: 2018-01-29

## 2018-01-25 RX ORDER — SODIUM CHLORIDE 0.9 % (FLUSH) 0.9 %
10 SYRINGE (ML) INJECTION EVERY 12 HOURS SCHEDULED
Status: DISCONTINUED | OUTPATIENT
Start: 2018-01-25 | End: 2018-01-25

## 2018-01-25 RX ORDER — FERROUS SULFATE 325(65) MG
325 TABLET ORAL
Status: CANCELLED | OUTPATIENT
Start: 2018-01-25

## 2018-01-25 RX ORDER — GABAPENTIN 300 MG/1
300 CAPSULE ORAL 2 TIMES DAILY
Status: DISCONTINUED | OUTPATIENT
Start: 2018-01-25 | End: 2018-01-25

## 2018-01-25 RX ORDER — SODIUM CHLORIDE 0.9 % (FLUSH) 0.9 %
10 SYRINGE (ML) INJECTION PRN
Status: DISCONTINUED | OUTPATIENT
Start: 2018-01-25 | End: 2018-01-25

## 2018-01-25 RX ORDER — FERROUS SULFATE 325(65) MG
325 TABLET ORAL
Status: DISCONTINUED | OUTPATIENT
Start: 2018-01-26 | End: 2018-02-03 | Stop reason: HOSPADM

## 2018-01-25 RX ORDER — PANTOPRAZOLE SODIUM 40 MG/1
40 TABLET, DELAYED RELEASE ORAL
Status: CANCELLED | OUTPATIENT
Start: 2018-01-26

## 2018-01-25 RX ORDER — ATORVASTATIN CALCIUM 40 MG/1
40 TABLET, FILM COATED ORAL NIGHTLY
Status: CANCELLED | OUTPATIENT
Start: 2018-01-25

## 2018-01-25 RX ORDER — PANTOPRAZOLE SODIUM 40 MG/1
40 TABLET, DELAYED RELEASE ORAL
Status: DISCONTINUED | OUTPATIENT
Start: 2018-01-26 | End: 2018-01-25

## 2018-01-25 RX ORDER — LOSARTAN POTASSIUM 25 MG/1
25 TABLET ORAL 2 TIMES DAILY
Status: CANCELLED | OUTPATIENT
Start: 2018-01-25

## 2018-01-25 RX ORDER — MELOXICAM 15 MG/1
15 TABLET ORAL DAILY
Status: CANCELLED | OUTPATIENT
Start: 2018-01-25

## 2018-01-25 RX ORDER — BUDESONIDE AND FORMOTEROL FUMARATE DIHYDRATE 160; 4.5 UG/1; UG/1
2 AEROSOL RESPIRATORY (INHALATION) 2 TIMES DAILY
Status: DISCONTINUED | OUTPATIENT
Start: 2018-01-25 | End: 2018-02-03 | Stop reason: HOSPADM

## 2018-01-25 RX ORDER — CARVEDILOL 25 MG/1
25 TABLET ORAL 2 TIMES DAILY WITH MEALS
Status: DISCONTINUED | OUTPATIENT
Start: 2018-01-25 | End: 2018-02-02

## 2018-01-25 RX ORDER — POLYETHYLENE GLYCOL 3350 17 G/17G
17 POWDER, FOR SOLUTION ORAL DAILY
Status: DISCONTINUED | OUTPATIENT
Start: 2018-01-26 | End: 2018-02-03 | Stop reason: HOSPADM

## 2018-01-25 RX ORDER — ACETAMINOPHEN 325 MG/1
650 TABLET ORAL EVERY 4 HOURS PRN
Status: CANCELLED | OUTPATIENT
Start: 2018-01-25

## 2018-01-25 RX ORDER — LOSARTAN POTASSIUM 25 MG/1
25 TABLET ORAL 2 TIMES DAILY
Status: DISCONTINUED | OUTPATIENT
Start: 2018-01-25 | End: 2018-02-03 | Stop reason: HOSPADM

## 2018-01-25 RX ORDER — BISACODYL 10 MG
10 SUPPOSITORY, RECTAL RECTAL DAILY PRN
Status: DISCONTINUED | OUTPATIENT
Start: 2018-01-25 | End: 2018-02-03 | Stop reason: HOSPADM

## 2018-01-25 RX ORDER — CLOPIDOGREL BISULFATE 75 MG/1
75 TABLET ORAL DAILY
Status: DISCONTINUED | OUTPATIENT
Start: 2018-01-26 | End: 2018-02-03 | Stop reason: HOSPADM

## 2018-01-25 RX ADMIN — Medication 10 ML: at 08:53

## 2018-01-25 RX ADMIN — CLOPIDOGREL BISULFATE 75 MG: 75 TABLET ORAL at 08:53

## 2018-01-25 RX ADMIN — MELOXICAM 15 MG: 15 TABLET ORAL at 08:58

## 2018-01-25 RX ADMIN — ATORVASTATIN CALCIUM 40 MG: 40 TABLET, FILM COATED ORAL at 20:02

## 2018-01-25 RX ADMIN — Medication 325 MG: at 08:58

## 2018-01-25 RX ADMIN — CARVEDILOL 25 MG: 25 TABLET ORAL at 08:58

## 2018-01-25 RX ADMIN — OMEPRAZOLE 20 MG: 20 CAPSULE, DELAYED RELEASE ORAL at 06:43

## 2018-01-25 RX ADMIN — ENOXAPARIN SODIUM 40 MG: 40 INJECTION SUBCUTANEOUS at 08:53

## 2018-01-25 RX ADMIN — BUDESONIDE AND FORMOTEROL FUMARATE DIHYDRATE 2 PUFF: 160; 4.5 AEROSOL RESPIRATORY (INHALATION) at 19:58

## 2018-01-25 RX ADMIN — GABAPENTIN 300 MG: 300 CAPSULE ORAL at 19:55

## 2018-01-25 RX ADMIN — BUDESONIDE AND FORMOTEROL FUMARATE DIHYDRATE 2 PUFF: 160; 4.5 AEROSOL RESPIRATORY (INHALATION) at 08:58

## 2018-01-25 RX ADMIN — LOSARTAN POTASSIUM 25 MG: 25 TABLET ORAL at 20:02

## 2018-01-25 RX ADMIN — ASPIRIN 81 MG: 81 TABLET ORAL at 08:58

## 2018-01-25 RX ADMIN — CARVEDILOL 25 MG: 25 TABLET ORAL at 18:15

## 2018-01-25 RX ADMIN — LOSARTAN POTASSIUM 25 MG: 25 TABLET ORAL at 08:53

## 2018-01-25 RX ADMIN — GABAPENTIN 300 MG: 300 CAPSULE ORAL at 08:58

## 2018-01-25 ASSESSMENT — PAIN SCALES - GENERAL: PAINLEVEL_OUTOF10: 5

## 2018-01-25 NOTE — PROGRESS NOTES
Speech Language Pathology  Facility/Department: [facility] ACUTE REHAB  Initial Speech/Language/Cognitive Assessment    NAME: Pebbles Hernandez  : 1940   MRN: [id number]  ADMISSION DATE: 2018  ADMITTING DIAGNOSIS: has TIA (transient ischemic attack); Cerebrovascular accident (CVA) due to occlusion of right middle cerebral artery ([address] Essential hypertension; and CVA (cerebral vascular accident) [facility]) on his problem list.      Date of Eval: 2018   Evaluating Therapist: Pedro Lozano, SLP    RECENT RESULTS  CT OF HEAD/MRI:   - Brain MRI-      Acute infarcts within the medial right temporal lobe and right occipital lobe. Primary Complaint:   Mr. Antwon Nicholson is a 76 y. o. right handed male who was admitted to [facility] on 2018 with Altered Mental Status     25-year-old male who on 2018 fell and hit his head next day he was noticing decreased balance.  He was taken [address] on 2018 for evaluation for possible CVA.  Patient already goes to physical therapy for balance dysfunction. Elizabet Schultz also has a history of peripheral vascular disease and macular degeneration.        Pain:  Pain Assessment  Patient Currently in Pain: Denies    Assessment:      Diagnosis: Pt. demonstrates functional speech, language and cognition at this time. No further ST recommended. Recommendations:  Requires SLP Intervention: No    Patient/family involved in developing goals and treatment plan: yes    Subjective:   Previous level of function and limitations: independent        Vision  Vision: Impaired  Vision Exceptions: Wears glasses at all times  Hearing  Hearing: Exceptions to [facility]  Hearing Exceptions: Hard of hearing/hearing concerns           Objective:     Oral/Motor  Oral Motor:  Within functional limits    Auditory Comprehension  Comprehension: Within Functional Limits         Expression  Primary Mode of Expression: Verbal    Verbal Expression  Verbal Expression: Within functional limits

## 2018-01-25 NOTE — PROGRESS NOTES
Patient transferred via wheelchair to acute rehab unit. No distress at this time. Patient has all belongings and home medications are going with him to other unit.

## 2018-01-25 NOTE — PROGRESS NOTES
Contact guard assistance    Current functional status for bed, chair, wheelchair transfers:  Transfers  Sit to Stand: Contact guard assistance (Posterior LOB self corrected upon standing)  Stand to sit: Contact guard assistance  Bed to Chair: Contact guard assistance  Comment: LOB backwards with initial stance when getting up from chair    Current functional status for toilet transfers: TBD       Current functional status for locomotion:  Ambulation  Ambulation?: Yes  Ambulation 1  Surface: level tile  Device: Rolling Walker  Assistance: Contact guard assistance  Quality of Gait: Fluctuating jillian, wide PJ, minimal knee and dorsiflexion, good posture, sway to left with LOB on turns to left  Distance: 40ft, 30ft, 10ft  Comments: fatigues quickly, 2 turns with 2 LOB, vc's to stay inside RW, vc's for heel/toe gait    Current functional status for comprehension:  TBD    Current functional status for expression: TBD    Current functional status for social interaction: TBD    Current functional status for problem solving:  TBD    Current functional status for memory: TBD    Current Deficits R/T Impairment: TBD    Required Therapy:   [x] Physical Therapy  [x] Occupational Therapy   [] Speech Therapy    Additional Services:  [x]   [x] Recreational Therapy  [x] Nutrition             [] Prosthetist/Orthotist  [] Dialysis  [] Other:     Rehab Justification:  Needs 3 hrs therapy per day or 15 hours per week:  Yes  Identified Rehab Nursing needs: Yes  Intense Interdisciplinary need:  Yes  Need for 24 hr physician supervision:  Yes  Measurable improved quality of life:  Yes  Willingness to participate:  Yes  Medical Necessity:  Yes  Patient able to tolerate care proposed:  Yes    Expected Discharge Destination/Functional Level:  Home possibly with Home PT/OT     Expected length of time to achieve that level of improvement: 7-10 days    Expected Post Discharge Treatments:  Possible home or outpatient

## 2018-01-25 NOTE — PROGRESS NOTES
task.  Short term goal 6: Pt will actively participate in 30 minutes of therapeutic exercise/activity to promote increased independence and safety with self-care and mobility. Long term goals  Time Frame for Long term goals : By Discharge  Long term goal 1: Pt will complete BADL's with Mod I and Good safety using AE if needed. Long term goal 2: Pt will complete functional transfers with Mod I and Good safety using appropriate DME. Long term goal 3: Pt will complete simple meal prep/light housekeeping tasks with Mod I and Good safety.     Assessment  Performance deficits / Impairments: Decreased functional mobility , Decreased ADL status, Decreased endurance, Decreased balance, Decreased safe awareness, Decreased vision/visual deficit, Decreased high-level IADLs  Treatment Diagnosis: Impaired self-care status  Prognosis: Good  Decision Making: Low Complexity  Patient Education: OT POC, safety, activity promotion, possible need for a Life Alert  Barriers to Learning: Low vision  REQUIRES OT FOLLOW UP: Yes  Discharge Recommendations: Home with assist PRN, Home with Home health OT  Plan  Times per week: 5-7  Times per day: Twice a day  Current Treatment Recommendations: Balance Training, Functional Mobility Training, Endurance Training, Safety Education & Training, Patient/Caregiver Education & Training, Equipment Evaluation, Education, & procurement, Self-Care / ADL, Neuromuscular Re-education, Home Management Training    Equipment Recommendations  Equipment Needed:  (TBD)     01/25/18 1406   OT Co-Treatment Minutes   Time In 1406   Time Out 1450   Minutes 44     Electronically signed by Fernie Mackenzie on 1/25/18 at 3:46 PM

## 2018-01-25 NOTE — DISCHARGE SUMMARY
Physician Discharge Summary     Patient ID:  Donna Lester  [id number]  13 y.o.  1940    Admit date: 1/22/2018    Discharge date and time:  1/25/18    Admitting Physician: Calixto Saleh DO     Discharge Physician: Calixto Saleh DO      Admission Diagnoses:   TIA (transient ischemic attack) [G45.9]  TIA (transient ischemic attack) [G45.9]    Discharge Diagnoses:   Patient Active Problem List   Diagnosis Code    TIA (transient ischemic attack) G45.9    Cerebrovascular accident (CVA) due to occlusion of right middle cerebral artery ([address] I63.511    Essential hypertension I10     Principal Problem:    Cerebrovascular accident (CVA) due to occlusion of right middle cerebral artery ([address]  Active Problems:    TIA (transient ischemic attack)    Essential hypertension      Admission Condition: fair    Discharged Condition: stable    Hospital Course: dx of cva    Significant Diagnostic Studies: radiology: MRI: right cerebral occlusion    Treatments: anticoagulation: ASA and Plavix    Disposition: ARU    Patient Instructions:     Discharge Medications:  Current Discharge Medication List           Details   aspirin 81 MG chewable tablet Take 81 mg by mouth daily      carvedilol (COREG) 12.5 MG tablet Take 25 mg by mouth 2 times daily (with meals)       Roflumilast (DALIRESP) 500 MCG tablet Take 500 mcg by mouth daily      ferrous sulfate 325 (65 Fe) MG tablet Take 325 mg by mouth daily (with breakfast)      gabapentin (NEURONTIN) 300 MG capsule Take 300 mg by mouth 2 times daily .       Lutein 20 MG TABS Take 20 mg/day by mouth      meloxicam (MOBIC) 15 MG tablet Take 15 mg by mouth daily      omeprazole (PRILOSEC) 20 MG delayed release capsule Take 20 mg by mouth Daily      tiotropium (SPIRIVA) 18 MCG inhalation capsule Inhale 18 mcg into the lungs daily      budesonide-formoterol (SYMBICORT) 160-4.5 MCG/ACT AERO Inhale 2 puffs into the lungs 2 times daily             Activity: activity as tolerated    Diet: regular

## 2018-01-25 NOTE — CARE COORDINATION
MARIBELL received info from Beni Min [facility], clinical lead that this patient was accepted into the ARU and that everything is completed so that he can admit to that unit in this date. MARIBELL called [facility] (covering or admissions in the ARU) and informed them of this information. MARIBELL received a call back from Elsy in the [place] and she reported that the nurse could now call report. MARIBELL informed Beni Min RN, clinical lead to have the nurse call report and then a time for transfer will be set.

## 2018-01-25 NOTE — H&P
regurgitation. Mild tricuspid regurgitation. No significant pericardial effusion is seen.       He is currently requiring assistance for self-care activities and mobility prompting this admission.     Premorbid function:  Independentdriving    Current Function:  PT:  Restrictions/Precautions: Fall Risk  Implants present? : Metal implants (L TKA 2009)       Current:  PT:  Restrictions/Precautions: Fall Risk  Implants present? : Metal implants (L TKA 2009)   Transfers  Sit to Stand: Contact guard assistance (Posterior LOB self corrected upon standing)  Stand to sit: Contact guard assistance  Bed to Chair: Contact guard assistance  Comment: LOB backwards with initial stance when getting up from chair  Ambulation 1  Surface: level tile  Device: Rolling Walker  Assistance: Contact guard assistance  Quality of Gait: Fluctuating jillian, wide PJ, minimal knee and dorsiflexion, good posture, sway to left with LOB on turns to left  Distance: 40ft, 30ft, 10ft  Comments: fatigues quickly, 2 turns with 2 LOB, vc's to stay inside RW, vc's for heel/toe gait     Transfers  Sit to Stand: Contact guard assistance (Posterior LOB self corrected upon standing)  Stand to sit: Contact guard assistance  Bed to Chair: Contact guard assistance  Comment: LOB backwards with initial stance when getting up from chair  Ambulation  Ambulation?: Yes  Ambulation 1  Surface: level tile  Device: Rolling Walker  Assistance: Contact guard assistance  Quality of Gait: Fluctuating jillian, wide PJ, minimal knee and dorsiflexion, good posture, sway to left with LOB on turns to left  Distance: 40ft, 30ft, 10ft  Comments: fatigues quickly, 2 turns with 2 LOB, vc's to stay inside RW, vc's for heel/toe gait     Surface: level tile  Ambulation 1  Surface: level tile  Device: Rolling Walker  Assistance: Contact guard assistance  Quality of Gait: Fluctuating jillian, wide PJ, minimal knee and dorsiflexion, good posture, sway to left with LOB on turns to left  Distance: 40ft, 30ft, 10ft  Comments: fatigues quickly, 2 turns with 2 LOB, vc's to stay inside RW, vc's for heel/toe gait        OT:   ADL  Feeding: Independent  Grooming: Setup  UE Bathing: Setup  LE Bathing: Contact guard assistance  UE Dressing: Setup  LE Dressing: Contact guard assistance  Toileting: Contact guard assistance  Additional Comments: CGA for safety with standing tasks      Past Medical History:      Diagnosis Date    Hypertension        Past Surgical History:      Procedure Laterality Date    ABDOMINAL AORTIC ANEURYSM REPAIR      CARPAL TUNNEL RELEASE Bilateral     COLECTOMY      JOINT REPLACEMENT      left total knee       Allergies:    Latex    Medications   Scheduled Meds:   [START ON 1/26/2018] enoxaparin  40 mg Subcutaneous Daily    atorvastatin  40 mg Oral Nightly    carvedilol  25 mg Oral BID WC    [START ON 1/26/2018] clopidogrel  75 mg Oral Daily    [START ON 1/26/2018] ferrous sulfate  325 mg Oral Daily with breakfast    losartan  25 mg Oral BID    [START ON 1/26/2018] meloxicam  15 mg Oral Daily    [START ON 1/26/2018] Roflumilast  500 mcg Oral Daily    [START ON 1/26/2018] tiotropium  18 mcg Inhalation Daily    [START ON 1/26/2018] polyethylene glycol  17 g Oral Daily    [START ON 1/26/2018] aspirin  81 mg Oral Daily    gabapentin  300 mg Oral BID    [START ON 1/26/2018] pantoprazole  40 mg Oral QAM AC    budesonide-formoterol  2 puff Inhalation BID     Continuous Infusions:   PRN Meds:.acetaminophen, magnesium hydroxide, bisacodyl     Diagnostics:     No results found for this or any previous visit (from the past 24 hour(s)). Social History:  Lives with:   Lives with son - works, but family / friends present and not can provide 24/7 initially  Home setup:   Steps into home 3 . Floors in home:  #2.    Bed/bathroom on floor  1. Tobacco quit. Ethanol social.    Family History:   No family history on file.     Review of Systems:  CONSTITUTIONAL:  Denies fevers, chills, sweats or fatigue. EYES:  Denies diplopia, blind spots, blurring. HEENT:  Denies hearing loss, trouble chewing or swallowing. RESPIRATORY:  No wheezing, coughing, shortness of breath. CARDIOVASCULAR:  Denies chest pain, palpitations, lightheadedness. GASTROINTESTINAL:  Denies heartburn, nausea, constipation, diarrhea, abdominal pain. GENITOURINARY:  No urgency, frequency, incontinence, dysuria. ENDOCRINE:  Denies hot or cold intolerance. MUSCULOSKELETAL:  Denies focal joint pain, back pain, neck pain. NEUROLOGICAL:  Denies focal weakness, numbness, tingling, balance loss, headache. BEHAVIOR/PSYCH:  Denies depression, anxiety, memory loss, insomnia. SKIN:  No ulcers, rash, bruises. Physical Exam:  BP (!) 151/64   Pulse 62   Temp 98.1 °F (36.7 °C) (Oral)   Resp 18   SpO2 100%   HEENT:  Symmetrical facial features. EOMI. Visual fields intact. Hearing intact. Speech fluent, no dystarthria. Comprehension intact. Object naming intact. Repetition intact. Basic cognition intact. NECK:  ROM functional.  Carotid bruit negative. THORAX:  Symmetrical.    LUNGS:  Clear to ausculation. HEART:  Regular. No murmurs of gallops. ABDOMEN:  Non-distended. Normal bowel sounds. No guarding, tenderness, mass. BACK:  No ulcers or deformity. EXTREMITIES:  PROM within functional limits. No calf tenderness, edema. Feet warm. NEUROMUSCULAR:  Sensation intact, no extinction. Coordination smooth. Motor testing 4/5 upper/lower extremities. Balance impaired. SKIN:  intact. 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 The post admission physician evaluation (LEILA) is consistent with the pre-admission assessment.   See above findings to reflect the elements

## 2018-01-25 NOTE — PROGRESS NOTES
Patient accepted to ARU. Prescreen is signed. Paperwork faxed to DENISE Billingsley notified that discharge readmission med rec needs to be completed and nurse needs to call report prior to patient going to ARU.   Electronically signed by Inocencio Suarez RN on 1/25/2018 at 9:35 AM

## 2018-01-26 LAB
ANION GAP SERPL CALCULATED.3IONS-SCNC: 11 MMOL/L (ref 9–17)
BUN BLDV-MCNC: 15 MG/DL (ref 8–23)
BUN/CREAT BLD: ABNORMAL (ref 9–20)
CALCIUM SERPL-MCNC: 8.6 MG/DL (ref 8.6–10.4)
CHLORIDE BLD-SCNC: 95 MMOL/L (ref 98–107)
CO2: 25 MMOL/L (ref 20–31)
CREAT SERPL-MCNC: 0.92 MG/DL (ref 0.7–1.2)
GFR AFRICAN AMERICAN: >60 ML/MIN
GFR NON-AFRICAN AMERICAN: >60 ML/MIN
GFR SERPL CREATININE-BSD FRML MDRD: ABNORMAL ML/MIN/{1.73_M2}
GFR SERPL CREATININE-BSD FRML MDRD: ABNORMAL ML/MIN/{1.73_M2}
GLUCOSE BLD-MCNC: 98 MG/DL (ref 70–99)
HCT VFR BLD CALC: 38.6 % (ref 41–53)
HEMOGLOBIN: 13.3 G/DL (ref 13.5–17.5)
MCH RBC QN AUTO: 33.2 PG (ref 26–34)
MCHC RBC AUTO-ENTMCNC: 34.4 G/DL (ref 31–37)
MCV RBC AUTO: 96.4 FL (ref 80–100)
NRBC AUTOMATED: ABNORMAL PER 100 WBC
PDW BLD-RTO: 14.7 % (ref 11.5–14.9)
PLATELET # BLD: 228 K/UL (ref 150–450)
PMV BLD AUTO: 6.8 FL (ref 6–12)
POTASSIUM SERPL-SCNC: 4.4 MMOL/L (ref 3.7–5.3)
RBC # BLD: 4 M/UL (ref 4.5–5.9)
SODIUM BLD-SCNC: 131 MMOL/L (ref 135–144)
WBC # BLD: 5.1 K/UL (ref 3.5–11)

## 2018-01-26 PROCEDURE — 6370000000 HC RX 637 (ALT 250 FOR IP): Performed by: FAMILY MEDICINE

## 2018-01-26 PROCEDURE — 85027 COMPLETE CBC AUTOMATED: CPT

## 2018-01-26 PROCEDURE — 80048 BASIC METABOLIC PNL TOTAL CA: CPT

## 2018-01-26 PROCEDURE — 97535 SELF CARE MNGMENT TRAINING: CPT

## 2018-01-26 PROCEDURE — 6360000002 HC RX W HCPCS: Performed by: FAMILY MEDICINE

## 2018-01-26 PROCEDURE — 36415 COLL VENOUS BLD VENIPUNCTURE: CPT

## 2018-01-26 PROCEDURE — 97110 THERAPEUTIC EXERCISES: CPT

## 2018-01-26 PROCEDURE — 99232 SBSQ HOSP IP/OBS MODERATE 35: CPT | Performed by: PHYSICAL MEDICINE & REHABILITATION

## 2018-01-26 PROCEDURE — 97530 THERAPEUTIC ACTIVITIES: CPT

## 2018-01-26 PROCEDURE — 1180000000 HC REHAB R&B

## 2018-01-26 PROCEDURE — 97116 GAIT TRAINING THERAPY: CPT

## 2018-01-26 RX ADMIN — Medication 325 MG: at 08:25

## 2018-01-26 RX ADMIN — GABAPENTIN 300 MG: 300 CAPSULE ORAL at 20:14

## 2018-01-26 RX ADMIN — LOSARTAN POTASSIUM 25 MG: 25 TABLET ORAL at 20:14

## 2018-01-26 RX ADMIN — BUDESONIDE AND FORMOTEROL FUMARATE DIHYDRATE 2 PUFF: 160; 4.5 AEROSOL RESPIRATORY (INHALATION) at 08:25

## 2018-01-26 RX ADMIN — CARVEDILOL 25 MG: 25 TABLET ORAL at 08:25

## 2018-01-26 RX ADMIN — ASPIRIN 81 MG: 81 TABLET ORAL at 08:25

## 2018-01-26 RX ADMIN — CARVEDILOL 25 MG: 25 TABLET ORAL at 17:12

## 2018-01-26 RX ADMIN — BUDESONIDE AND FORMOTEROL FUMARATE DIHYDRATE 2 PUFF: 160; 4.5 AEROSOL RESPIRATORY (INHALATION) at 20:15

## 2018-01-26 RX ADMIN — CLOPIDOGREL BISULFATE 75 MG: 75 TABLET ORAL at 08:24

## 2018-01-26 RX ADMIN — ATORVASTATIN CALCIUM 40 MG: 40 TABLET, FILM COATED ORAL at 20:14

## 2018-01-26 RX ADMIN — LOSARTAN POTASSIUM 25 MG: 25 TABLET ORAL at 08:24

## 2018-01-26 RX ADMIN — ENOXAPARIN SODIUM 40 MG: 40 INJECTION SUBCUTANEOUS at 08:24

## 2018-01-26 RX ADMIN — MELOXICAM 15 MG: 15 TABLET ORAL at 08:24

## 2018-01-26 RX ADMIN — PANTOPRAZOLE SODIUM 40 MG: 40 TABLET, DELAYED RELEASE ORAL at 05:24

## 2018-01-26 RX ADMIN — GABAPENTIN 300 MG: 300 CAPSULE ORAL at 08:24

## 2018-01-26 ASSESSMENT — PAIN SCALES - GENERAL
PAINLEVEL_OUTOF10: 0
PAINLEVEL_OUTOF10: 0

## 2018-01-26 NOTE — PROGRESS NOTES
Physical Therapy  [address]  Acute Rehabilitation Physical Therapy Progress Note    Date: 18  Patient Name: Zane Acuna       Room: 3485/4335-78  MRN: [id number]   Account: [de-identified]   : 1940  (73 y.o.) Gender: male     Referring Practitioner: Dr. Kavita Adams  Diagnosis: CVA - R temporal and occiptal infarcts  Past Medical History:  has a past medical history of Hypertension. Past Surgical History:   has a past surgical history that includes Abdominal aortic aneurysm repair; colectomy; joint replacement; and Carpal tunnel release (Bilateral). Additional Pertinent Hx: Pt presents to ER on 18 with c/o dizzy episodes, balance deficits, falls, and AMS. Pt brought to hospital by stroke team, CT brain (-). Pt hit head with most recent fall on 18. MRI: R temporal and R occipital infarcts. Overall Orientation Status: Within Normal Limits  Restrictions/Precautions  Restrictions/Precautions: Fall Risk  Required Braces or Orthoses?: No  Implants present? : Metal implants            Patient Currently in Pain: Denies                   Bed Mobility:   Scooting: Stand by assistance      Transfers:  Sit to Stand: Contact guard assistance  Stand to sit: Contact guard assistance  Bed to Chair: Contact guard assistance  Stand Pivot Transfers: Contact guard assistance (With RW)           Ambulation 1  Surface: level tile  Device: Rolling Walker  Assistance: Contact guard assistance  Quality of Gait: slightly decreased clearance of L foot with fatigue, no LOB with turns, no c/o dizziness  Distance: 100 ft  AM and PM; small distances within the gym        Stairs/Curb  Stairs?: Yes  Stairs  # Steps : 6  Stairs Height: 4\"  Rails: Bilateral  Device: No Device  Assistance: Contact guard assistance  Comment: Ascending FWD and descending BWD. Patient reports he goes down steps BWD at home due to wearing bifocals.            Wheelchair Activities  Propulsion: Yes  Propulsion 1  Propulsion: Manual  Level: Level Tile  Method: RUE;LUE  Level of Assistance: Stand by assistance  Description/ Details: 100 ft              FIMS:      Transfers  Bed, Chair, Wheel Chair: 4 - Requires steadying assistance only <25% assist  and/or requires assist with one leg only   Locomotion  Primary Mode: Walk  Distance Walked: 100 ft  Distance Traveled in Wheel Chair: 100 ft  Walk: 2 - Maximal Assistance Requires up to [address] requires assistance of one person to walk/operate wheelchair between  feet (Patient performs 25-49% of locomotion effort or goes between  feet)  Wheel Chair: 2 - Maximal Assistance Requires up to [address] requires assistance of one person to walk/operate wheelchair between  feet  Stairs: 2- Maximal Assistance Performs 25-49% of the effort to go up and down 4 to 6 stairs and requires the assistance of one person only       BALANCE Posture: Good  Sitting - Static: Good  Sitting - Dynamic: Good  Standing - Static: Fair;+  Standing - Dynamic: Fair;+  Comments: Seated at EOB without back support, standing with RW    EXERCISES    Other exercises 1: Seated bilat. LE ex. 2x10 reps  Other exercises 2: Seated red TB ex x 15 reps  Other exercises 3: UBE  5mins FWD and BWD seated  Other exercises 4: NuStep 10 mins. Workload 4           Activity Tolerance: Patient Tolerated treatment well          Patient Education  New Education Provided: POC, safety  Learner:patient  Method: explanation       Outcome: needs reinforcement     Current Treatment Recommendations: Balance Training, Functional Mobility Training, Transfer Training, Endurance Training, Gait Training, Stair training, Home Exercise Program, Safety Education & Training, Patient/Caregiver Education & Training, Equipment Evaluation, Education, & procurement    Conditions Requiring Skilled Therapeutic Intervention  Body structures, Functions, Activity limitations: Decreased functional mobility ; Decreased safe awareness;Decreased balance;Decreased vision/visual deficit; Decreased endurance  Treatment Diagnosis: CVA  Prognosis: Good  Patient Education: POC, safety  REQUIRES PT FOLLOW UP: Yes  Discharge Recommendations: Home with Home health PT;Home with assist PRN    Goals  Short term goals  Time Frame for Short term goals: 5 days  Short term goal 1: Increase balance to good with RW  Short term goal 2: Improve bed mob to IND  Short term goal 3: Improve transfers to SBA  Long term goals  Time Frame for Long term goals : 10 days   Long term goal 1: Increase standing balance to good with least restricitve device to increase safety at home  Long term goal 2:  Increase endurance to good to progress PT sessions   Long term goal 3: Improve bed mobility to IND from flat bed to prepare for home  Long term goal 4: Improve transfers with least restrictive device to MOD IND to prepare pt to be home alone   Long term goal 5: Improve gait with least restrictive device to MOD  ft to prepare for home  Long term goal 6: Improve stair ambulation with rail use to IND up/down 4 steps to allow pt to access home       01/26/18 1100 01/26/18 1515   PT Individual Minutes   Time In 1115 1515   Time Out [id number]   Minutes 20 60   PT Concurrent Minutes   Time In 1100 --    Time Out 1115 --    Minutes 15 --        Electronically signed by Adolfo Mao PTA on 1/26/18 at 4:45 PM

## 2018-01-26 NOTE — PROGRESS NOTES
overall endurance with functional tasks. Exercises  Scapular Protraction: x  Scapular Retraction: x  Shoulder Flexion: x  Shoulder ABduction: x  Horizontal ABduction: x  Horizontal ADduction: x  Elbow Flexion: x  Elbow Extension: x  Supination: x  Pronation: x  Wrist Flexion: x  Wrist Extension: x    OT FIM:   Groomin - Requires setup/cues to do all tasks  Bathin - Able to bathe all 10 areas with setup/sup/cues  Dressing-Upper: 5 - Requires setup/supervision/cues and/or requires assist with presthesis/brace only (pt. wears overhead shirt. Pt.clarisa shirt backwared but able correct it with vc's.)  Dressing-Lower: 5 - Requires setup/supervision/cues and/or staff applies TEDS/prosthesis/brace only (pt. put both legs in one underwear leg and wore it backward, able to self correct. clarisa socks and shoes indep. )  Toiletin - Requires setup/supervision/cues  Toilet Transfer: 4 - Requires steadying assistance only < 25% assist  Primary Mode: Shower  Tub Transfer: 0 - Activity does not occur  Shower Transfer: 4 - Minimal contact assistance, pt. expends 75% or more effort    Assessment  Performance deficits / Impairments: Decreased functional mobility ; Decreased ADL status; Decreased endurance;Decreased balance;Decreased safe awareness;Decreased vision/visual deficit; Decreased high-level IADLs  Prognosis: Good  Discharge Recommendations: Home with assist PRN;Home with Home health OT  Activity Tolerance: Patient Tolerated treatment well  Safety Devices in place: Yes  Type of devices: Nurse notified; Left in chair;Call light within reach  Restraints  Initially in place: No  Equipment Recommendations  Equipment Needed: Yes (TBD)       Patient Education:  Patient Goals   Patient goals : Pt wants to return home with family A/support as needed.   Patient Education: walker safety, ADL's,   Barriers to Learning: Low vision    Plan  Plan  Times per week: 5-7  Times per day: Twice a day  Current Treatment Recommendations:

## 2018-01-27 LAB
ANION GAP SERPL CALCULATED.3IONS-SCNC: 10 MMOL/L (ref 9–17)
CHLORIDE BLD-SCNC: 95 MMOL/L (ref 98–107)
CO2: 27 MMOL/L (ref 20–31)
POTASSIUM SERPL-SCNC: 4.2 MMOL/L (ref 3.7–5.3)
SODIUM BLD-SCNC: 132 MMOL/L (ref 135–144)

## 2018-01-27 PROCEDURE — 97530 THERAPEUTIC ACTIVITIES: CPT

## 2018-01-27 PROCEDURE — 1180000000 HC REHAB R&B

## 2018-01-27 PROCEDURE — 97110 THERAPEUTIC EXERCISES: CPT

## 2018-01-27 PROCEDURE — 99232 SBSQ HOSP IP/OBS MODERATE 35: CPT | Performed by: PHYSICAL MEDICINE & REHABILITATION

## 2018-01-27 PROCEDURE — 97116 GAIT TRAINING THERAPY: CPT

## 2018-01-27 PROCEDURE — 6360000002 HC RX W HCPCS: Performed by: FAMILY MEDICINE

## 2018-01-27 PROCEDURE — 80051 ELECTROLYTE PANEL: CPT

## 2018-01-27 PROCEDURE — 6370000000 HC RX 637 (ALT 250 FOR IP): Performed by: PHYSICAL MEDICINE & REHABILITATION

## 2018-01-27 PROCEDURE — 36415 COLL VENOUS BLD VENIPUNCTURE: CPT

## 2018-01-27 PROCEDURE — 6370000000 HC RX 637 (ALT 250 FOR IP): Performed by: FAMILY MEDICINE

## 2018-01-27 RX ADMIN — LOSARTAN POTASSIUM 25 MG: 25 TABLET ORAL at 20:17

## 2018-01-27 RX ADMIN — Medication 325 MG: at 07:44

## 2018-01-27 RX ADMIN — PANTOPRAZOLE SODIUM 40 MG: 40 TABLET, DELAYED RELEASE ORAL at 05:30

## 2018-01-27 RX ADMIN — BUDESONIDE AND FORMOTEROL FUMARATE DIHYDRATE 2 PUFF: 160; 4.5 AEROSOL RESPIRATORY (INHALATION) at 07:48

## 2018-01-27 RX ADMIN — GABAPENTIN 300 MG: 300 CAPSULE ORAL at 07:42

## 2018-01-27 RX ADMIN — ENOXAPARIN SODIUM 40 MG: 40 INJECTION SUBCUTANEOUS at 07:39

## 2018-01-27 RX ADMIN — LOSARTAN POTASSIUM 25 MG: 25 TABLET ORAL at 07:39

## 2018-01-27 RX ADMIN — MELOXICAM 15 MG: 15 TABLET ORAL at 07:43

## 2018-01-27 RX ADMIN — CLOPIDOGREL BISULFATE 75 MG: 75 TABLET ORAL at 07:39

## 2018-01-27 RX ADMIN — GABAPENTIN 300 MG: 300 CAPSULE ORAL at 20:15

## 2018-01-27 RX ADMIN — CARVEDILOL 25 MG: 25 TABLET ORAL at 17:54

## 2018-01-27 RX ADMIN — ASPIRIN 81 MG: 81 TABLET ORAL at 07:44

## 2018-01-27 RX ADMIN — CARVEDILOL 25 MG: 25 TABLET ORAL at 07:45

## 2018-01-27 RX ADMIN — BUDESONIDE AND FORMOTEROL FUMARATE DIHYDRATE 2 PUFF: 160; 4.5 AEROSOL RESPIRATORY (INHALATION) at 20:15

## 2018-01-27 RX ADMIN — ATORVASTATIN CALCIUM 40 MG: 40 TABLET, FILM COATED ORAL at 20:17

## 2018-01-27 ASSESSMENT — PAIN SCALES - GENERAL: PAINLEVEL_OUTOF10: 0

## 2018-01-27 NOTE — PROGRESS NOTES
Patient reports he goes down steps BWD at home due to wearing bifocals. FIMS:      Transfers  Bed, Chair, Wheel Chair: 4 - Requires steadying assistance only <25% assist  and/or requires assist with one leg only   Locomotion  Primary Mode: Walk  Distance Walked: 120 ft  Walk: 2 - Maximal Assistance Requires up to [address] requires assistance of one person to walk/operate wheelchair between  feet (Patient performs 25-49% of locomotion effort or goes between  feet)  Stairs: 2- Maximal Assistance Performs 25-49% of the effort to go up and down 4 to 6 stairs and requires the assistance of one person only       BALANCE Posture: Good  Sitting - Static: Good  Sitting - Dynamic: Good  Standing - Static: Fair;+  Standing - Dynamic: Fair;+  Comments: Seated at EOB without back support, standing with RW    EXERCISES    Other exercises 1: Seated bilat. LE ex. 2x10 reps  Other exercises 2: Seated red TB ex x 15 reps  Other exercises 3: UBE  5mins FWD and BWD seated  Other exercises 4: NuStep 10 mins. Workload 4  Other exercises 5: Supine bilat. LE ex  2x10 reps           Activity Tolerance: Patient Tolerated treatment well              Current Treatment Recommendations: Balance Training, Functional Mobility Training, Transfer Training, Endurance Training, Gait Training, Stair training, Home Exercise Program, Safety Education & Training, Patient/Caregiver Education & Training, Equipment Evaluation, Education, & procurement    Conditions Requiring Skilled Therapeutic Intervention  Body structures, Functions, Activity limitations: Decreased functional mobility ; Decreased safe awareness;Decreased balance;Decreased vision/visual deficit; Decreased endurance  Treatment Diagnosis: CVA  Prognosis: Good  Patient Education: POC, safety  REQUIRES PT FOLLOW UP: Yes  Discharge Recommendations: Home with Home health PT;Home with assist PRN    Goals  Short term goals  Time Frame for Short term goals: 5

## 2018-01-27 NOTE — PROGRESS NOTES
Physical Medicine & Rehabilitation  Progress Note    1/27/2018 8:14 AM     CC: Ambulatory and ADL dysfunction due to CVA with acute medial right temporal and right occipital lobe infarcts. Subjective:     No new complaints. +BM on Friday. Denies CP and SOB. ROS:  Denies fevers, chills, sweats. No chest pain, palpitations, lightheadedness. Denies coughing, wheezing or shortness of breath. Denies abdominal pain, nausea, diarrhea or constipation. No new areas of joint pain. Denies new areas of numbness or weakness. Denies new anxiety or depression issues. No new skin problems.     Rehabilitation:   PT:  Restrictions/Precautions: Fall Risk  Implants present? : Metal implants   Transfers  Sit to Stand: Contact guard assistance  Stand to sit: Contact guard assistance  Bed to Chair: Contact guard assistance  Stand Pivot Transfers: Contact guard assistance (With RW)  Ambulation 1  Surface: level tile  Device: Rolling Walker  Assistance: Contact guard assistance  Quality of Gait: slightly decreased clearance of L foot with fatigue, no LOB with turns, no c/o dizziness  Distance: 100 ft  AM and PM; small distances within the gym    Transfers  Sit to Stand: Contact guard assistance  Stand to sit: Contact guard assistance  Bed to Chair: Contact guard assistance  Stand Pivot Transfers: Contact guard assistance (With RW)  Ambulation  Ambulation?: Yes  More Ambulation?: Yes  Ambulation 1  Surface: level tile  Device: Rolling Walker  Assistance: Contact guard assistance  Quality of Gait: slightly decreased clearance of L foot with fatigue, no LOB with turns, no c/o dizziness  Distance: 100 ft  AM and PM; small distances within the gym    Surface: level tile  Ambulation 1  Surface: level tile  Device: Rolling Walker  Assistance: Contact guard assistance  Quality of Gait: slightly decreased clearance of L foot with fatigue, no LOB with turns, no c/o dizziness  Distance: 100 ft  AM and PM; small distances within the Oral Daily    gabapentin  300 mg Oral BID    pantoprazole  40 mg Oral QAM AC    budesonide-formoterol  2 puff Inhalation BID     Continuous Infusions:   PRN Meds:.acetaminophen, magnesium hydroxide, bisacodyl     Diagnostics:     CBC:   Recent Labs      01/26/18   0635   WBC  5.1   RBC  4.00*   HGB  13.3*   HCT  38.6*   MCV  96.4   RDW  14.7   PLT  228     BMP:   Recent Labs      01/26/18   0635  01/27/18   0704   NA  131*  132*   K  4.4  4.2   CL  95*  95*   CO2  25  27   BUN  15   --    CREATININE  0.92   --      BNP: No results for input(s): BNP in the last 72 hours. PT/INR: No results for input(s): PROTIME, INR in the last 72 hours. APTT: No results for input(s): APTT in the last 72 hours. CARDIAC ENZYMES: No results for input(s): CKMB, CKMBINDEX, TROPONINT in the last 72 hours. Invalid input(s): CKTOTAL;3  FASTING LIPID PANEL:  Lab Results   Component Value Date    CHOL 170 01/22/2018    HDL 49 01/22/2018    TRIG 60 01/22/2018     LIVER PROFILE: No results for input(s): AST, ALT, ALB, BILIDIR, BILITOT, ALKPHOS in the last 72 hours. I/O (24Hr): No intake or output data in the 24 hours ending 01/27/18 0814    Glu last 24 hour  No results for input(s): POCGLU in the last 72 hours. No results for input(s): CLARITYU, COLORU, PHUR, SPECGRAV, PROTEINU, RBCUA, BLOODU, BACTERIA, NITRU, WBCUA, LEUKOCYTESUR, YEAST, GLUCOSEU, BILIRUBINUR in the last 72 hours. Impression/Plan:    1. Ambulatory and ADL dysfunction secondary to CVA with acute medial right temporal and right occipital lobe infarctscontinue rehab efforts, progressing well, continue work on balance, safety, etc.  2. CVAon statin and Plavix and aspirin  3. History balance dysfunction was in physical therapy, monitor. 4. HypertensionCoreg and Cozaar  5. GERDProtonix. 6. Anemiairon  7. NeuropathyNeurontin  8. COPD Symbicort, Spiriva,daliresp. 9. Hyponatremiarecheck sodium 132.  Improving CPM.  10. DVT prophylaxisLovenox, EPC cuffs,

## 2018-01-27 NOTE — PROGRESS NOTES
Problem: Falls - Risk of  Goal: Absence of falls  Outcome: Ongoing  Patient remains free from falls/injuries at this time. Call light within reach. Safety maintained this shift. Will continue to monitor.

## 2018-01-27 NOTE — PROGRESS NOTES
Dr. Crescencio Mendoza NOTE        Patient:  Pebbles Hernandez  YOB: 1940    MRN: [id number]     Acct: [de-identified]     Admit date: 1/25/2018    Pt seen and Chart reviewed. Consultant notes reviewed and care evaluated. Problem List:  Patient Active Problem List   Diagnosis Code    TIA (transient ischemic attack) G45.9    Cerebrovascular accident (CVA) due to occlusion of right middle cerebral artery (HCC) I63.511    Essential hypertension I10    CVA (cerebral vascular accident) ([facility] [address] I63.9         Subjective: first visit to ARU after transfer  Doing well  medically stable, BP controlled  Will follow      Diet:  DIET CARDIAC;      Medications:Current Inpatient    Scheduled Meds:   pneumococcal 13-valent conjugate  0.5 mL Intramuscular Once    enoxaparin  40 mg Subcutaneous Daily    atorvastatin  40 mg Oral Nightly    carvedilol  25 mg Oral BID WC    clopidogrel  75 mg Oral Daily    ferrous sulfate  325 mg Oral Daily with breakfast    losartan  25 mg Oral BID    meloxicam  15 mg Oral Daily    Roflumilast  500 mcg Oral Daily    tiotropium  18 mcg Inhalation Daily    polyethylene glycol  17 g Oral Daily    aspirin  81 mg Oral Daily    gabapentin  300 mg Oral BID    pantoprazole  40 mg Oral QAM AC    budesonide-formoterol  2 puff Inhalation BID     Continuous Infusions:   PRN Meds:acetaminophen, magnesium hydroxide, bisacodyl    Objective:    Physical Exam:  Vitals: BP (!) 142/61   Pulse 73   Temp 97.9 °F (36.6 °C) (Oral)   Resp 16   SpO2 96%   24 hour intake/output:  Intake/Output Summary (Last 24 hours) at 01/27/18 1000  Last data filed at 01/27/18 0839   Gross per 24 hour   Intake              360 ml   Output                0 ml   Net              360 ml     Last 3 weights:    Wt Readings from Last 3 Encounters:   01/25/18 215 lb 2.7 oz (97.6 kg)       Physical Examination:   General appearance - alert, well appearing, and in no distress  Mental status - alert, oriented to person, place, and time  Chest - clear to auscultation, no wheezes, rales or rhonchi, symmetric air entry  Heart - normal rate, regular rhythm, normal S1, S2, no murmurs, rubs, clicks or gallops  Labs:-    CBC:   Recent Labs      01/26/18   0635   WBC  5.1   HGB  13.3*   PLT  228     BMP:  Recent Labs      01/26/18   0635  01/27/18   0704   NA  131*  132*   K  4.4  4.2   CL  95*  95*   CO2  25  27   BUN  15   --    CREATININE  0.92   --    GLUCOSE  98   --      Glucose:No results for input(s): POCGLU in the last 72 hours. HgbA1C: No results for input(s): LABA1C in the last 72 hours. INR: No results for input(s): INR in the last 72 hours. CARDIAC ENZYMES:No results for input(s): CKTOTAL, CKMB, CKMBINDEX, TROPONINI in the last 72 hours. BNP: No results for input(s): BNP in the last 72 hours. Lipids: No results for input(s): CHOL, TRIG, HDL, LDLCALC in the last 72 hours.     Invalid input(s): LDL  ABGs: No results found for: PH, PCO2, PO2, HCO3, O2SAT  Thyroid:   Lab Results   Component Value Date    TSH 0.58 01/22/2018      Urinalysis: Color, UA   Date Value Ref Range Status   01/22/2018 YELLOW YEL Final     pH, UA   Date Value Ref Range Status   01/22/2018 6.5 5.0 - 8.0 Final     Specific Gravity, UA   Date Value Ref Range Status   01/22/2018 1.009 1.000 - 1.030 Final     Protein, UA   Date Value Ref Range Status   01/22/2018 NEGATIVE NEG Final     Nitrite, Urine   Date Value Ref Range Status   01/22/2018 NEGATIVE NEG Final     Leukocyte Esterase, Urine   Date Value Ref Range Status   01/22/2018 NEGATIVE NEG Final     Glucose, Ur   Date Value Ref Range Status   01/22/2018 NEGATIVE NEG Final     Bilirubin Urine   Date Value Ref Range Status   01/22/2018 NEGATIVE NEG Final       CULTURES:    Current Rehabilitation Assessments:  PHYSICAL THERAPY:     OCCUPATIONAL THERAPY:     SPEECH:

## 2018-01-28 LAB
ANION GAP SERPL CALCULATED.3IONS-SCNC: 9 MMOL/L (ref 9–17)
BUN BLDV-MCNC: 19 MG/DL (ref 8–23)
BUN/CREAT BLD: ABNORMAL (ref 9–20)
CALCIUM SERPL-MCNC: 9 MG/DL (ref 8.6–10.4)
CHLORIDE BLD-SCNC: 96 MMOL/L (ref 98–107)
CO2: 30 MMOL/L (ref 20–31)
CREAT SERPL-MCNC: 1.2 MG/DL (ref 0.7–1.2)
GFR AFRICAN AMERICAN: >60 ML/MIN
GFR NON-AFRICAN AMERICAN: 58 ML/MIN
GFR SERPL CREATININE-BSD FRML MDRD: ABNORMAL ML/MIN/{1.73_M2}
GFR SERPL CREATININE-BSD FRML MDRD: ABNORMAL ML/MIN/{1.73_M2}
GLUCOSE BLD-MCNC: 104 MG/DL (ref 70–99)
POTASSIUM SERPL-SCNC: 4.5 MMOL/L (ref 3.7–5.3)
SODIUM BLD-SCNC: 135 MMOL/L (ref 135–144)

## 2018-01-28 PROCEDURE — 6360000002 HC RX W HCPCS: Performed by: FAMILY MEDICINE

## 2018-01-28 PROCEDURE — 97116 GAIT TRAINING THERAPY: CPT

## 2018-01-28 PROCEDURE — 97530 THERAPEUTIC ACTIVITIES: CPT

## 2018-01-28 PROCEDURE — 1180000000 HC REHAB R&B

## 2018-01-28 PROCEDURE — 97110 THERAPEUTIC EXERCISES: CPT

## 2018-01-28 PROCEDURE — 36415 COLL VENOUS BLD VENIPUNCTURE: CPT

## 2018-01-28 PROCEDURE — 80048 BASIC METABOLIC PNL TOTAL CA: CPT

## 2018-01-28 PROCEDURE — 6370000000 HC RX 637 (ALT 250 FOR IP): Performed by: FAMILY MEDICINE

## 2018-01-28 PROCEDURE — 97535 SELF CARE MNGMENT TRAINING: CPT

## 2018-01-28 PROCEDURE — 99232 SBSQ HOSP IP/OBS MODERATE 35: CPT | Performed by: PHYSICAL MEDICINE & REHABILITATION

## 2018-01-28 RX ADMIN — LOSARTAN POTASSIUM 25 MG: 25 TABLET ORAL at 20:16

## 2018-01-28 RX ADMIN — MELOXICAM 15 MG: 15 TABLET ORAL at 07:31

## 2018-01-28 RX ADMIN — PANTOPRAZOLE SODIUM 40 MG: 40 TABLET, DELAYED RELEASE ORAL at 05:16

## 2018-01-28 RX ADMIN — LOSARTAN POTASSIUM 25 MG: 25 TABLET ORAL at 07:28

## 2018-01-28 RX ADMIN — ASPIRIN 81 MG: 81 TABLET ORAL at 07:32

## 2018-01-28 RX ADMIN — CARVEDILOL 25 MG: 25 TABLET ORAL at 07:33

## 2018-01-28 RX ADMIN — GABAPENTIN 300 MG: 300 CAPSULE ORAL at 20:16

## 2018-01-28 RX ADMIN — ENOXAPARIN SODIUM 40 MG: 40 INJECTION SUBCUTANEOUS at 07:28

## 2018-01-28 RX ADMIN — ATORVASTATIN CALCIUM 40 MG: 40 TABLET, FILM COATED ORAL at 20:16

## 2018-01-28 RX ADMIN — CARVEDILOL 25 MG: 25 TABLET ORAL at 16:52

## 2018-01-28 RX ADMIN — BUDESONIDE AND FORMOTEROL FUMARATE DIHYDRATE 2 PUFF: 160; 4.5 AEROSOL RESPIRATORY (INHALATION) at 07:30

## 2018-01-28 RX ADMIN — GABAPENTIN 300 MG: 300 CAPSULE ORAL at 07:30

## 2018-01-28 RX ADMIN — Medication 325 MG: at 07:33

## 2018-01-28 RX ADMIN — CLOPIDOGREL BISULFATE 75 MG: 75 TABLET ORAL at 07:28

## 2018-01-28 RX ADMIN — BUDESONIDE AND FORMOTEROL FUMARATE DIHYDRATE 2 PUFF: 160; 4.5 AEROSOL RESPIRATORY (INHALATION) at 20:18

## 2018-01-28 ASSESSMENT — PAIN SCALES - GENERAL
PAINLEVEL_OUTOF10: 0
PAINLEVEL_OUTOF10: 0

## 2018-01-28 NOTE — PROGRESS NOTES
Balance  Time: Am: 3-4 min x 3 (PM: 2-3 min x 2, 8-9 min.)  Activity: AM: functional mobility, transfers (PM: ADLs, transfers)  Sit to stand: Minimal assistance (AM: min A 2* increased unsteadiness, PM: CGA)  Stand to sit: Maximum assistance (AM: Max A 2* LOB, PM: SBA, VCs for correct tech c inconsistent return)       Functional Mobility  Functional - Mobility Device: Rolling Walker  Activity: To/from bathroom (AM: chair>shower c RW, Bath>bed c WC c max A PM: to/from bathroom)  Assist Level: Maximum assistance (CGA during functional mobility, req max A to recover from LOB during AM shower transfer, PM: CGA c transfers, no LOB noted)    Functional Mobility Comments: Pt requires VC's with walker safety and positioning c inconsistent return    ADL  Additional Comments: See ADL FIM flowsheet for detailed report.  Pt retrieved/transported clothing at RW level      OT FIM:   Groomin - Requires setup/cues to do all tasks (pt stand at sink for oral/hair care)  Bathin - Able to bathe 8-9 areas (A c buttocks in stand 2* unsteadiness, does not follow safety precautions consistently)  Dressing-Upper: 5 - Requires setup/supervision/cues and/or requires assist with presthesis/brace only (supervision)  Dressing-Lower: 4 - Requires assist with buttons/zippers/shoelaces and/or assist with shoes only (CGA 2* unsteadiness, VCs for 1 hand on RW for safety c P return)  Toiletin - Requires steadying assistance only (CGA, VCs for 1 hand on RW for safety, P return)  Toilet Transfer: 4 - Requires steadying assistance only < 25% assist (CGA)  Primary Mode: Shower  Tub Transfer: 0 - Activity does not occur  Shower Transfer: 4 - Minimal contact assistance, pt. expends 75% or more effort (PM: CGA 2* unsteadiness, VCs for safety and tech c P return, no LOB noted)    Social Interaction: 7 - Patient has appropriate behavior/relations 100% of the time  Problem Solvin - Independent with device (e.g. notes, schedules)  Memory: 5 -

## 2018-01-29 LAB
ANION GAP SERPL CALCULATED.3IONS-SCNC: 9 MMOL/L (ref 9–17)
BUN BLDV-MCNC: 15 MG/DL (ref 8–23)
BUN/CREAT BLD: ABNORMAL (ref 9–20)
CALCIUM SERPL-MCNC: 9 MG/DL (ref 8.6–10.4)
CHLORIDE BLD-SCNC: 93 MMOL/L (ref 98–107)
CO2: 30 MMOL/L (ref 20–31)
CREAT SERPL-MCNC: 1.02 MG/DL (ref 0.7–1.2)
GFR AFRICAN AMERICAN: >60 ML/MIN
GFR NON-AFRICAN AMERICAN: >60 ML/MIN
GFR SERPL CREATININE-BSD FRML MDRD: ABNORMAL ML/MIN/{1.73_M2}
GFR SERPL CREATININE-BSD FRML MDRD: ABNORMAL ML/MIN/{1.73_M2}
GLUCOSE BLD-MCNC: 113 MG/DL (ref 70–99)
HCT VFR BLD CALC: 40.6 % (ref 41–53)
HEMOGLOBIN: 13.9 G/DL (ref 13.5–17.5)
MCH RBC QN AUTO: 33.1 PG (ref 26–34)
MCHC RBC AUTO-ENTMCNC: 34.3 G/DL (ref 31–37)
MCV RBC AUTO: 96.5 FL (ref 80–100)
NRBC AUTOMATED: ABNORMAL PER 100 WBC
PDW BLD-RTO: 14.8 % (ref 11.5–14.9)
PLATELET # BLD: 265 K/UL (ref 150–450)
PMV BLD AUTO: 6.6 FL (ref 6–12)
POTASSIUM SERPL-SCNC: 4.2 MMOL/L (ref 3.7–5.3)
RBC # BLD: 4.2 M/UL (ref 4.5–5.9)
SODIUM BLD-SCNC: 132 MMOL/L (ref 135–144)
WBC # BLD: 4.9 K/UL (ref 3.5–11)

## 2018-01-29 PROCEDURE — 97110 THERAPEUTIC EXERCISES: CPT

## 2018-01-29 PROCEDURE — 6360000002 HC RX W HCPCS: Performed by: FAMILY MEDICINE

## 2018-01-29 PROCEDURE — 97530 THERAPEUTIC ACTIVITIES: CPT

## 2018-01-29 PROCEDURE — 97150 GROUP THERAPEUTIC PROCEDURES: CPT

## 2018-01-29 PROCEDURE — 97535 SELF CARE MNGMENT TRAINING: CPT

## 2018-01-29 PROCEDURE — 99232 SBSQ HOSP IP/OBS MODERATE 35: CPT | Performed by: PHYSICAL MEDICINE & REHABILITATION

## 2018-01-29 PROCEDURE — 80048 BASIC METABOLIC PNL TOTAL CA: CPT

## 2018-01-29 PROCEDURE — 1180000000 HC REHAB R&B

## 2018-01-29 PROCEDURE — 36415 COLL VENOUS BLD VENIPUNCTURE: CPT

## 2018-01-29 PROCEDURE — 6370000000 HC RX 637 (ALT 250 FOR IP): Performed by: FAMILY MEDICINE

## 2018-01-29 PROCEDURE — 97116 GAIT TRAINING THERAPY: CPT

## 2018-01-29 PROCEDURE — 85027 COMPLETE CBC AUTOMATED: CPT

## 2018-01-29 PROCEDURE — 6370000000 HC RX 637 (ALT 250 FOR IP): Performed by: PHYSICAL MEDICINE & REHABILITATION

## 2018-01-29 RX ADMIN — BUDESONIDE AND FORMOTEROL FUMARATE DIHYDRATE 2 PUFF: 160; 4.5 AEROSOL RESPIRATORY (INHALATION) at 20:49

## 2018-01-29 RX ADMIN — ATORVASTATIN CALCIUM 40 MG: 40 TABLET, FILM COATED ORAL at 20:49

## 2018-01-29 RX ADMIN — GABAPENTIN 300 MG: 300 CAPSULE ORAL at 08:26

## 2018-01-29 RX ADMIN — BUDESONIDE AND FORMOTEROL FUMARATE DIHYDRATE 2 PUFF: 160; 4.5 AEROSOL RESPIRATORY (INHALATION) at 08:30

## 2018-01-29 RX ADMIN — LOSARTAN POTASSIUM 25 MG: 25 TABLET ORAL at 08:25

## 2018-01-29 RX ADMIN — Medication 325 MG: at 08:28

## 2018-01-29 RX ADMIN — MELOXICAM 15 MG: 15 TABLET ORAL at 08:28

## 2018-01-29 RX ADMIN — GABAPENTIN 300 MG: 300 CAPSULE ORAL at 20:49

## 2018-01-29 RX ADMIN — CARVEDILOL 25 MG: 25 TABLET ORAL at 17:29

## 2018-01-29 RX ADMIN — CLOPIDOGREL BISULFATE 75 MG: 75 TABLET ORAL at 08:25

## 2018-01-29 RX ADMIN — CARVEDILOL 25 MG: 25 TABLET ORAL at 08:27

## 2018-01-29 RX ADMIN — ASPIRIN 81 MG: 81 TABLET ORAL at 08:27

## 2018-01-29 RX ADMIN — POLYETHYLENE GLYCOL 3350 17 G: 17 POWDER, FOR SOLUTION ORAL at 08:25

## 2018-01-29 RX ADMIN — PANTOPRAZOLE SODIUM 40 MG: 40 TABLET, DELAYED RELEASE ORAL at 05:51

## 2018-01-29 RX ADMIN — ENOXAPARIN SODIUM 40 MG: 40 INJECTION SUBCUTANEOUS at 08:25

## 2018-01-29 RX ADMIN — LOSARTAN POTASSIUM 25 MG: 25 TABLET ORAL at 20:49

## 2018-01-29 ASSESSMENT — PAIN SCALES - GENERAL
PAINLEVEL_OUTOF10: 0
PAINLEVEL_OUTOF10: 0

## 2018-01-29 NOTE — PROGRESS NOTES
Physical Therapy  [address] [facility]   Acute Rehabilitation Physical Therapy Progress Note    Date: 18  Patient Name: Donna Lester       Room: 3617/1424-91  MRN: [id number]   Account: [de-identified]   : 1940  (73 y.o.) Gender: male     Referring Practitioner: Dr. Duran Aiken  Diagnosis: CVA - R temporal and occiptal infarcts  Past Medical History:  has a past medical history of Hypertension. Past Surgical History:   has a past surgical history that includes Abdominal aortic aneurysm repair; colectomy; joint replacement; and Carpal tunnel release (Bilateral). Additional Pertinent Hx: Pt presents to ER on 18 with c/o dizzy episodes, balance deficits, falls, and AMS. Pt brought to hospital by stroke team, CT brain (-). Pt hit head with most recent fall on 18. MRI: R temporal and R occipital infarcts. Overall Orientation Status: Within Normal Limits  Restrictions/Precautions  Restrictions/Precautions: Fall Risk  Required Braces or Orthoses?: No  Implants present? : Metal implants    Subjective: Patient reports he would use a RW at home PRN for stability. Comments: Patient did not report dizziness during AM or PM session today. Vital Signs  Patient Currently in Pain: Denies           Patient Observation  Observations:  Motivated and cooperative       Bed Mobility:   Supine to Sit: Stand by assistance  Sit to Supine: Stand by assistance  Scooting: Stand by assistance      Transfers:  Sit to Stand: Contact guard assistance  Stand to sit: Contact guard assistance  Bed to Chair: Contact guard assistance  Stand Pivot Transfers: Contact guard assistance (With RW)           Ambulation 1  Surface: level tile  Device: Rolling Walker  Assistance: Contact guard assistance  Quality of Gait: slightly decreased clearance of L foot with fatigue, no LOB with turns, no c/o dizziness  Distance: 150 ft  AM and PM; small distances within the gym       Ambulation 2  Surface - 2: level tile;ramp  Device 2: No device  Assistance 2: Minimal assistance  Quality of Gait 2: Visual deficits and confusion with directions at times. 2 LOB with min x 1 to correct. No reports of dizziness. Distance: 100 ft x 2     Stairs/Curb  Stairs?: Yes  Stairs  # Steps : 6  Stairs Height: 6\"  Rails: Bilateral  Device: No Device  Assistance: Contact guard assistance  Comment: Ascending FWD and descending BWD. Patient reports he goes down steps BWD at home due to wearing bifocals. FIMS:      Transfers  Bed, Chair, Wheel Chair: 4 - Requires steadying assistance only <25% assist  and/or requires assist with one leg only   Locomotion  Primary Mode: Walk  Distance Walked: 150 ft  Walk: 4 - Contact Guard/Minimal Assistance Requires up to Contact Guard or Minimal Assistance to walk/operate wheelchair at least 150 feet  Stairs: 2- Maximal Assistance Performs 25-49% of the effort to go up and down 4 to 6 stairs and requires the assistance of one person only       BALANCE Posture: Good  Sitting - Static: Good  Sitting - Dynamic: Good  Standing - Static: Fair;+  Standing - Dynamic: Fair;+  Comments: Seated at EOB without back support, standing with RW    EXERCISES    Other exercises 1: Seated bilat. LE ex. 2x10 reps  Other exercises 2: Seated red TB ex x 15 reps  Other exercises 3: UBE  5mins FWD and BWD standing;  SBA  Other exercises 4: NuStep 15 mins. Workload 4  Other exercises 5: Supine bilat. LE ex  2x10 reps  Other exercises 6: balance activities in // bars;  tapping balls with R and L foot. Required 1 UE support for balance.            Activity Tolerance: Patient Tolerated treatment well         Current Treatment Recommendations: Balance Training, Functional Mobility Training, Transfer Training, Endurance Training, Gait Training, Stair training, Home Exercise Program, Safety Education & Training, Patient/Caregiver Education & Training, Equipment Evaluation, Education, & procurement    Conditions

## 2018-01-29 NOTE — PROGRESS NOTES
completed gabi. UE stgth ex. with red thera-band, 12 reps. in all planes to increase stgth and overall endurance with functional tasks. Exercises  Scapular Protraction: x  Scapular Retraction: x  Shoulder Flexion: x  Shoulder ABduction: x  Horizontal ABduction: x  Elbow Extension: x    OT FIM:   Eatin - Feeds self with adaptive equipment/dentures and/or feeds self with modified diet and/or performs own tube feeding  Groomin - Independent with all tasks using assistive device (pt. reports, he had shaved and cleaned dentures prior to OT session)  Bathin - Able to bathe all 10 areas with setup/sup/cues (completed shower)  Dressing-Upper: 5 - Requires setup/supervision/cues and/or requires assist with presthesis/brace only (pt. wear long sleeve overhead shirt and sweater)  Dressing-Lower: 5 - Requires setup/supervision/cues and/or staff applies TEDS/prosthesis/brace only  Toiletin - Did not occur  Toilet Transfer: 0 - Did not occur  Primary Mode: Shower  Tub Transfer: 0 - Activity does not occur  Shower Transfer: 5 - Supervision, set-up, cues    Social Interaction: 7 - Patient has appropriate behavior/relations 100% of the time  Problem Solvin - Independent with device (e.g. notes, schedules)  Memory: 5 - Patient requires prompting with stress/unfamiliar situations    Assessment  Performance deficits / Impairments: Decreased functional mobility   Assessment: PM : Pt. also completed dispensing medication . pt. was able to open containers, read labels correctely and place then in correct time slots. Prognosis: Good  Discharge Recommendations: Home with assist PRN;Home with Home health OT  Activity Tolerance: Patient Tolerated treatment well  Safety Devices in place: Yes  Type of devices: Nurse notified; Left in chair;Call light within reach  Restraints  Initially in place: No  Equipment Recommendations  Equipment Needed:  (TBD)       Patient Education:  Patient Goals   Patient goals : Pt wants to

## 2018-01-29 NOTE — PROGRESS NOTES
dizziness  Distance: 150 ft  AM and PM; small distances within the gym    OT FIM:   Groomin - Requires setup/cues to do all tasks  Bathin - Able to bathe all 10 areas with setup/sup/cues  Dressing-Upper: 5 - Requires setup/supervision/cues and/or requires assist with presthesis/brace only (pt. wears overhead shirt. Pt.clarisa shirt backwared but able correct it with vc's.)  Dressing-Lower: 5 - Requires setup/supervision/cues and/or staff applies TEDS/prosthesis/brace only (pt. put both legs in one underwear leg and wore it backward, able to self correct. clarisa socks and shoes indep. )  Toiletin - Requires setup/supervision/cues  Toilet Transfer: 4 - Requires steadying assistance only < 25% assist  Primary Mode: Shower  Tub Transfer: 0 - Activity does not occur  Shower Transfer: 4 - Minimal contact assistance, pt. expends 75% or more effort    Objective:  BP (!) 145/61   Pulse 69   Temp 97.9 °F (36.6 °C) (Oral)   Resp 18   Ht 5' 10.47\" (1.79 m)   Wt 215 lb 2.7 oz (97.6 kg)   SpO2 98%   BMI 30.46 kg/m²  I Body mass index is 30.46 kg/m². I   Wt Readings from Last 1 Encounters:   18 215 lb 2.7 oz (97.6 kg)      Temp (24hrs), Av.6 °F (36.4 °C), Min:97.2 °F (36.2 °C), Max:97.9 °F (36.6 °C)      Alert, no distress. Good speech and language function. Lungs clear. Heart regular. Abdomen non-distended, non-tender. No calf tenderness or edema. PROM within functional limits. No calf tenderness, edema. Feet warm. Sensation intact, no extinction. Coordination smooth. Motor testing 4/5 upper/lower extremities. Balance impaired.       Medications   Scheduled Meds:   pneumococcal 13-valent conjugate  0.5 mL Intramuscular Once    enoxaparin  40 mg Subcutaneous Daily    atorvastatin  40 mg Oral Nightly    carvedilol  25 mg Oral BID WC    clopidogrel  75 mg Oral Daily    ferrous sulfate  325 mg Oral Daily with breakfast    losartan  25 mg Oral BID    meloxicam  15 mg Oral Daily   

## 2018-01-29 NOTE — PROGRESS NOTES
person, place, and time  Chest - clear to auscultation, no wheezes, rales or rhonchi, symmetric air entry  Heart - normal rate, regular rhythm, normal S1, S2, no murmurs, rubs, clicks or gallops      Labs:-    CBC:   Recent Labs      01/29/18   0634   WBC  4.9   HGB  13.9   PLT  265     BMP:    Recent Labs      01/27/18   0704  01/28/18   0611  01/29/18   0634   NA  132*  135  132*   K  4.2  4.5  4.2   CL  95*  96*  93*   CO2  27  30  30   BUN   --   19  15   CREATININE   --   1.20  1.02   GLUCOSE   --   104*  113*     Glucose:No results for input(s): POCGLU in the last 72 hours. HgbA1C: No results for input(s): LABA1C in the last 72 hours. INR: No results for input(s): INR in the last 72 hours. CARDIAC ENZYMES:No results for input(s): CKTOTAL, CKMB, CKMBINDEX, TROPONINI in the last 72 hours. BNP: No results for input(s): BNP in the last 72 hours. Lipids: No results for input(s): CHOL, TRIG, HDL, LDLCALC in the last 72 hours.     Invalid input(s): LDL  ABGs: No results found for: PH, PCO2, PO2, HCO3, O2SAT  Thyroid:   Lab Results   Component Value Date    TSH 0.58 01/22/2018      Urinalysis:   Color, UA   Date Value Ref Range Status   01/22/2018 YELLOW YEL Final     pH, UA   Date Value Ref Range Status   01/22/2018 6.5 5.0 - 8.0 Final     Specific Gravity, UA   Date Value Ref Range Status   01/22/2018 1.009 1.000 - 1.030 Final     Protein, UA   Date Value Ref Range Status   01/22/2018 NEGATIVE NEG Final     Nitrite, Urine   Date Value Ref Range Status   01/22/2018 NEGATIVE NEG Final     Leukocyte Esterase, Urine   Date Value Ref Range Status   01/22/2018 NEGATIVE NEG Final     Glucose, Ur   Date Value Ref Range Status   01/22/2018 NEGATIVE NEG Final     Bilirubin Urine   Date Value Ref Range Status   01/22/2018 NEGATIVE NEG Final       CULTURES:    Current Rehabilitation Assessments:  PHYSICAL THERAPY:     OCCUPATIONAL THERAPY:     SPEECH:      Assessment:  Principal Problem:    Cerebrovascular accident (CVA) due to occlusion of right middle cerebral artery (HCC)  Active Problems:    TIA (transient ischemic attack)    Essential hypertension    CVA (cerebral vascular accident) ([facility] [address]      Plan:  1. Rehab follow labs and bp  2.  Sodium better    Mark Olvera MD             1/29/2018, 7:15 AM

## 2018-01-30 PROCEDURE — G0009 ADMIN PNEUMOCOCCAL VACCINE: HCPCS | Performed by: FAMILY MEDICINE

## 2018-01-30 PROCEDURE — 90670 PCV13 VACCINE IM: CPT | Performed by: FAMILY MEDICINE

## 2018-01-30 PROCEDURE — 6370000000 HC RX 637 (ALT 250 FOR IP): Performed by: FAMILY MEDICINE

## 2018-01-30 PROCEDURE — 97150 GROUP THERAPEUTIC PROCEDURES: CPT

## 2018-01-30 PROCEDURE — 6360000002 HC RX W HCPCS: Performed by: FAMILY MEDICINE

## 2018-01-30 PROCEDURE — 97535 SELF CARE MNGMENT TRAINING: CPT

## 2018-01-30 PROCEDURE — 97110 THERAPEUTIC EXERCISES: CPT

## 2018-01-30 PROCEDURE — 99232 SBSQ HOSP IP/OBS MODERATE 35: CPT | Performed by: PHYSICAL MEDICINE & REHABILITATION

## 2018-01-30 PROCEDURE — 97116 GAIT TRAINING THERAPY: CPT

## 2018-01-30 PROCEDURE — 97530 THERAPEUTIC ACTIVITIES: CPT

## 2018-01-30 PROCEDURE — 1180000000 HC REHAB R&B

## 2018-01-30 PROCEDURE — 6370000000 HC RX 637 (ALT 250 FOR IP): Performed by: PHYSICAL MEDICINE & REHABILITATION

## 2018-01-30 RX ADMIN — ATORVASTATIN CALCIUM 40 MG: 40 TABLET, FILM COATED ORAL at 21:15

## 2018-01-30 RX ADMIN — GABAPENTIN 300 MG: 300 CAPSULE ORAL at 21:15

## 2018-01-30 RX ADMIN — LOSARTAN POTASSIUM 25 MG: 25 TABLET ORAL at 07:37

## 2018-01-30 RX ADMIN — CARVEDILOL 25 MG: 25 TABLET ORAL at 17:12

## 2018-01-30 RX ADMIN — CLOPIDOGREL BISULFATE 75 MG: 75 TABLET ORAL at 07:37

## 2018-01-30 RX ADMIN — GABAPENTIN 300 MG: 300 CAPSULE ORAL at 07:40

## 2018-01-30 RX ADMIN — POLYETHYLENE GLYCOL 3350 17 G: 17 POWDER, FOR SOLUTION ORAL at 07:37

## 2018-01-30 RX ADMIN — LOSARTAN POTASSIUM 25 MG: 25 TABLET ORAL at 21:15

## 2018-01-30 RX ADMIN — ENOXAPARIN SODIUM 40 MG: 40 INJECTION SUBCUTANEOUS at 07:37

## 2018-01-30 RX ADMIN — PNEUMOCOCCAL 13-VALENT CONJUGATE VACCINE 0.5 ML: 2.2; 2.2; 2.2; 2.2; 2.2; 4.4; 2.2; 2.2; 2.2; 2.2; 2.2; 2.2; 2.2 INJECTION, SUSPENSION INTRAMUSCULAR at 17:13

## 2018-01-30 RX ADMIN — BUDESONIDE AND FORMOTEROL FUMARATE DIHYDRATE 2 PUFF: 160; 4.5 AEROSOL RESPIRATORY (INHALATION) at 07:40

## 2018-01-30 RX ADMIN — BUDESONIDE AND FORMOTEROL FUMARATE DIHYDRATE 2 PUFF: 160; 4.5 AEROSOL RESPIRATORY (INHALATION) at 21:14

## 2018-01-30 RX ADMIN — PANTOPRAZOLE SODIUM 40 MG: 40 TABLET, DELAYED RELEASE ORAL at 05:56

## 2018-01-30 RX ADMIN — CARVEDILOL 25 MG: 25 TABLET ORAL at 07:42

## 2018-01-30 RX ADMIN — ASPIRIN 81 MG: 81 TABLET ORAL at 07:41

## 2018-01-30 RX ADMIN — Medication 325 MG: at 07:42

## 2018-01-30 ASSESSMENT — PAIN SCALES - GENERAL: PAINLEVEL_OUTOF10: 0

## 2018-01-30 NOTE — PROGRESS NOTES
Physical Medicine & Rehabilitation  Progress Note    1/30/2018 2:51 PM     CC: Ambulatory and ADL dysfunction due to CVA with acute medial right temporal and right occipital lobe infarcts    Subjective:   Feels well. No complaints. Bowel/bladder okay. No pain. Feels better today. Found on floor today by nursingpatient notes slipped on his own shoelace. No trauma or pain    ROS:  Denies fevers, chills, sweats. No chest pain, palpitations, lightheadedness. Denies coughing, wheezing or shortness of breath. Denies abdominal pain, nausea, diarrhea or constipation. No new areas of joint pain. Denies new areas of numbness or weakness. Denies new anxiety or depression issues. No new skin problems.     Rehabilitation:     See team conference note    PT:  Restrictions/Precautions: Fall Risk  Implants present? : Metal implants   Transfers  Sit to Stand: Contact guard assistance  Stand to sit: Contact guard assistance  Bed to Chair: Contact guard assistance  Stand Pivot Transfers: Contact guard assistance (With RW)  Ambulation 1  Surface: level tile  Device: Rolling Walker  Assistance: Contact guard assistance  Quality of Gait: slightly decreased clearance of L foot with fatigue, no LOB with turns, no c/o dizziness  Distance: 150 ft  AM and PM; small distances within the gym    Transfers  Sit to Stand: Contact guard assistance  Stand to sit: Contact guard assistance  Bed to Chair: Contact guard assistance  Stand Pivot Transfers: Contact guard assistance (With RW)  Ambulation  Ambulation?: Yes  More Ambulation?: Yes  Ambulation 1  Surface: level tile  Device: Rolling Walker  Assistance: Contact guard assistance  Quality of Gait: slightly decreased clearance of L foot with fatigue, no LOB with turns, no c/o dizziness  Distance: 150 ft  AM and PM; small distances within the gym    Surface: level tile  Ambulation 1  Surface: level tile  Device: Rolling Walker  Assistance: Contact guard assistance  Quality of Gait: CL  96*  93*   CO2  30  30   BUN  19  15   CREATININE  1.20  1.02     BNP: No results for input(s): BNP in the last 72 hours. PT/INR: No results for input(s): PROTIME, INR in the last 72 hours. APTT: No results for input(s): APTT in the last 72 hours. CARDIAC ENZYMES: No results for input(s): CKMB, CKMBINDEX, TROPONINT in the last 72 hours. Invalid input(s): CKTOTAL;3  FASTING LIPID PANEL:  Lab Results   Component Value Date    CHOL 170 01/22/2018    HDL 49 01/22/2018    TRIG 60 01/22/2018     LIVER PROFILE: No results for input(s): AST, ALT, ALB, BILIDIR, BILITOT, ALKPHOS in the last 72 hours. I/O (24Hr): Intake/Output Summary (Last 24 hours) at 01/30/18 1451  Last data filed at 01/29/18 2130   Gross per 24 hour   Intake              600 ml   Output                0 ml   Net              600 ml       Glu last 24 hour  No results for input(s): POCGLU in the last 72 hours. No results for input(s): CLARITYU, COLORU, PHUR, SPECGRAV, PROTEINU, RBCUA, BLOODU, BACTERIA, NITRU, WBCUA, LEUKOCYTESUR, YEAST, GLUCOSEU, BILIRUBINUR in the last 72 hours. Impression/Plan:    1. Ambulatory and ADL dysfunction secondary to CVA with acute medial right temporal and right occipital lobe infarctscontinue rehab efforts, progressing well, continue work on balance, safety, etc.Team conference Revieweddischarge plan to restart 2018will need 24/7 supervision due to concerns of falls. This was reviewed with patient and he notes she will have 24/7social work to verify  2. CVAon statin and Plavix and aspirin  3. History balance dysfunction monitor  4. HypertensionCoreg and Cozaar,, blood pressure better  5. Hyponatremiasodium 132 todayon 1500 mL fluid restriction per family practice recheck Thursday  6. GERDProtonix  7. Anemiairon  8. Questionable meloxicamchronic, patient is own medicationwill NH as creatinine was up to 1.2monitordiscussed with patient  9. NeuropathyNeurontin  10.  COPDSymbicort,

## 2018-01-30 NOTE — PROGRESS NOTES
Physical Therapy  [address]  Acute Rehabilitation Physical Therapy Progress Note    Date: 18  Patient Name: Pebbles Hernandez       Room: [facility]/4580-69  MRN: [id number]   Account: [de-identified]   : 1940  (73 y.o.) Gender: male     Referring Practitioner: Dr. Kecia Wheeler  Diagnosis: CVA - R temporal and occiptal infarcts  Past Medical History:  has a past medical history of Hypertension. Past Surgical History:   has a past surgical history that includes Abdominal aortic aneurysm repair; colectomy; joint replacement; and Carpal tunnel release (Bilateral). Additional Pertinent Hx: Pt presents to ER on 18 with c/o dizzy episodes, balance deficits, falls, and AMS. Pt brought to hospital by stroke team, CT brain (-). Pt hit head with most recent fall on 18. MRI: R temporal and R occipital infarcts. Overall Orientation Status: Within Normal Limits  Restrictions/Precautions  Restrictions/Precautions: Fall Risk  Required Braces or Orthoses?: No  Implants present? : Metal implants    Subjective: Patient reports he would use a RW at home PRN for stability. Patient reports he is getting up and using RW in room on his own. Patient educated on importance and safety of calling for assistance before getting up. Comments: Patient did not report dizziness during AM or PM session today. Vital Signs  Patient Currently in Pain: Denies           Patient Observation  Observations:  Motivated and cooperative       Bed Mobility:   Supine to Sit: Stand by assistance  Sit to Supine: Stand by assistance  Scooting: Stand by assistance    Transfers:  Sit to Stand: Stand by assistance  Stand to sit: Stand by assistance  Bed to Chair: Contact guard assistance  Stand Pivot Transfers: Contact guard assistance (With RW)           Ambulation 1  Surface: level tile  Device: Rolling Walker  Assistance: Contact guard assistance  Quality of Gait: slightly decreased clearance of L foot with fatigue, no LOB

## 2018-01-30 NOTE — CARE COORDINATION
Physical Therapy  Acute Rehab Unit Full FIM Progress    Admission score Current score    Goal       Self-Care     Eatin - Feeds self with adaptive equipment/dentures and/or feeds self with modified diet and/or performs own tube feeding 6 - Feeds self with adaptive equipment/dentures and/or feeds self with modified diet and/or performs own tube feeding 6 (Dentures)   Groomin - Requires setup/cues to do all tasks 6 - Independent with all tasks using assistive device (pt. reports, he had shaved and cleaned dentures prior to OT session) 6   Bathin - Able to bathe 8-9 areas (CGA for safety in standing) 5 - Able to bathe all 10 areas with setup/sup/cues (completed shower) 6   Dressing-Upper: 5 - Requires setup/supervision/cues and/or requires assist with presthesis/brace only (Set-up A only.) 5 - Requires setup/supervision/cues and/or requires assist with presthesis/brace only (pt. wear long sleeve overhead shirt and sweater) 6   Dressing-Lower: 4 - Requires assist with buttons/zippers/shoelaces and/or assist with shoes only (Min A for sitting balance; CGA/Min A to pull pants up) 5 - Requires setup/supervision/cues and/or staff applies TEDS/prosthesis/brace only 6   Toiletin - Did not occur 5 - Requires setup/supervision/cues 6     Spincter Control     Bladder: 7 - Patient urinates in toilet independently    7 - Patient urinates in toilet independently 7             Bladder Frequency of Accidents: 7 - No accidents    7 - No accidents    Bowel: 6 - Uses toilet independently with device or oral medication(s)    6 - Uses toilet independently with device or oral medication(s) 7   Bowel Level of Assistance: 6- Modified [place] 6- Modified [place]    Bowel Frequency of Accidents: 6 - No accidents: uses device    6- Modified [place]         Transfers     Bed, Chair, Wheel Chair: 4 - Requires steadying assistance only <25% assist  and/or requires assist with one leg only    4 - Requires steadying

## 2018-01-30 NOTE — CARE COORDINATION
[address] Acute Rehab Unit   Date: 2018    Patient Care Needs  Patient Name: Michelle Garcia       Room: 4772/8141-96 D/C : 18  : 1940  (76 y.o.)     Gender: male   Diagnosis: CVA    Vision  Vision: Impaired  Vision Exceptions: Wears glasses at all times (pt has macular degeneration and cataracts. Pt gets shots in eyes. Pt denies any new vision deficits)  Hearing  Hearing: Exceptions to [facility]  Hearing Exceptions: Hard of hearing/hearing concerns    Personal Equipment and Devices: (eyeglasses, hearing,aids, dentures)  Assistive Devices: Eyeglasses, Dentures upper, Dentures lower    Feeding / Swallow:   Eatin - Feeds self with adaptive equipment/dentures and/or feeds self with modified diet and/or performs own tube feeding    Diet Ordered: Cardiac, Fluid Restriction       Precautions: Precautions: Fall risk,Restrictions/Precautions  Restrictions/Precautions: Fall Risk  Required Braces or Orthoses?: No  Implants present? : Metal implants,   Restrictions/Precautions: Fall Risk Required Braces or Orthoses?: No    Mobility Equipment needs:  Device: Rolling Walker Assistance: Contact guard assistance    Bed Mobility:  Bed mobility  Rolling to Left: Stand by assistance  Rolling to Right: Stand by assistance  Supine to Sit: Stand by assistance  Sit to Supine: Stand by assistance  Scooting: Stand by assistance    Transfers:   Sit to Stand: Contact guard assistance   Bed to Chair: Contact guard assistance Stand Pivot Transfers: Contact guard assistance (With RW)      Posture: Good      Ambulation Status:  Assistance: Contact guard assistance  Quality of Gait: slightly decreased clearance of L foot with fatigue, no LOB with turns, no c/o dizziness Distance: 150 ft  AM and PM; small distances within the gym  Device: [employer]  # Steps : 6  Stairs Height: 6\"  Rails: Bilateral  Device: No Device  Assistance: Contact guard assistance  Comment: Ascending FWD and descending BWD. Patient reports he goes down steps BWD at home due to wearing bifocals. Self-care Equipment Needs:  Equipment Needed:  (TBD)    Self-care Assist Bathin - Able to bathe all 10 areas with setup/sup/cues (completed shower)  Dressing-Upper: 5 - Requires setup/supervision/cues and/or requires assist with presthesis/brace only (pt. wear long sleeve overhead shirt and sweater)  Dressing-Lower: 5 - Requires setup/supervision/cues and/or staff applies TEDS/prosthesis/brace only  Toiletin - Requires setup/supervision/cues  Toilet Transfer: 0 - Did not occur    Nursing   Toilet Every 2 Hours-In Advance of Need: YesActivity: Up to chair,Activity and Safety  Activity: Up to chair  Repositioned: Turns self  Distance Ambulated (ft): 30 ft  Ambulation Response:  Tolerated well  Assistive Device: None  Anti-Embolism Devices: Bilateral, Pneumatic compression devices, below knee  Anti-Embolism Intervention: Refused (pt educated on importance ),Activity: Up to chair,Pressure Ulcer or Non-Healing Wound: No  Alarm On: Bed   Bladder: 7 - Patient urinates in toilet independently  Bowel: 6 - Uses toilet independently with device or oral medication(s)    Wound Care Documentation and Therapy:

## 2018-01-30 NOTE — PROGRESS NOTES
goal 4: Pt will V/D good understanding of fall prevention strategies and task modification for low vision to increase ease and safety with functional ADL's. Short term goal 5: Pt will tolerate standing for 10+ minutes with 0-1 UE support and no LOB while completing a dynamic functional task. Short term goal 6: Pt will actively participate in 30 minutes of therapeutic exercise/activity to promote increased independence and safety with self-care and mobility. Long term goals  Time Frame for Long term goals : By Discharge  Long term goal 1: Pt will complete BADL's with Mod I and Good safety using AE if needed. Long term goal 2: Pt will complete functional transfers with Mod I and Good safety using appropriate DME. Long term goal 3: Pt will complete simple meal prep/light housekeeping tasks with Mod I and Good safety.         01/30/18 1531 01/30/18 1541   OT Individual Minutes   Time In 1284 1231   Time Out 0935 1300   Minutes 57 29     Electronically signed by ADELA Hernandez on 1/30/18 at 3:41 PM

## 2018-01-31 PROCEDURE — 1180000000 HC REHAB R&B

## 2018-01-31 PROCEDURE — 6360000002 HC RX W HCPCS: Performed by: FAMILY MEDICINE

## 2018-01-31 PROCEDURE — 97116 GAIT TRAINING THERAPY: CPT

## 2018-01-31 PROCEDURE — 97530 THERAPEUTIC ACTIVITIES: CPT

## 2018-01-31 PROCEDURE — 97535 SELF CARE MNGMENT TRAINING: CPT

## 2018-01-31 PROCEDURE — 99232 SBSQ HOSP IP/OBS MODERATE 35: CPT | Performed by: PHYSICAL MEDICINE & REHABILITATION

## 2018-01-31 PROCEDURE — 97110 THERAPEUTIC EXERCISES: CPT

## 2018-01-31 PROCEDURE — 6370000000 HC RX 637 (ALT 250 FOR IP): Performed by: FAMILY MEDICINE

## 2018-01-31 RX ADMIN — CARVEDILOL 25 MG: 25 TABLET ORAL at 17:43

## 2018-01-31 RX ADMIN — Medication 325 MG: at 07:35

## 2018-01-31 RX ADMIN — GABAPENTIN 300 MG: 300 CAPSULE ORAL at 22:04

## 2018-01-31 RX ADMIN — PANTOPRAZOLE SODIUM 40 MG: 40 TABLET, DELAYED RELEASE ORAL at 07:36

## 2018-01-31 RX ADMIN — LOSARTAN POTASSIUM 25 MG: 25 TABLET ORAL at 07:36

## 2018-01-31 RX ADMIN — BUDESONIDE AND FORMOTEROL FUMARATE DIHYDRATE 2 PUFF: 160; 4.5 AEROSOL RESPIRATORY (INHALATION) at 07:33

## 2018-01-31 RX ADMIN — GABAPENTIN 300 MG: 300 CAPSULE ORAL at 07:35

## 2018-01-31 RX ADMIN — ATORVASTATIN CALCIUM 40 MG: 40 TABLET, FILM COATED ORAL at 22:04

## 2018-01-31 RX ADMIN — CLOPIDOGREL BISULFATE 75 MG: 75 TABLET ORAL at 07:34

## 2018-01-31 RX ADMIN — ENOXAPARIN SODIUM 40 MG: 40 INJECTION SUBCUTANEOUS at 07:34

## 2018-01-31 RX ADMIN — CARVEDILOL 25 MG: 25 TABLET ORAL at 07:33

## 2018-01-31 RX ADMIN — BUDESONIDE AND FORMOTEROL FUMARATE DIHYDRATE 2 PUFF: 160; 4.5 AEROSOL RESPIRATORY (INHALATION) at 22:03

## 2018-01-31 RX ADMIN — LOSARTAN POTASSIUM 25 MG: 25 TABLET ORAL at 22:04

## 2018-01-31 RX ADMIN — ASPIRIN 81 MG: 81 TABLET ORAL at 07:32

## 2018-01-31 ASSESSMENT — PAIN SCALES - GENERAL
PAINLEVEL_OUTOF10: 0

## 2018-01-31 NOTE — PROGRESS NOTES
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 No complaints. Bowel/bladder okay. No pain. Feels better today. No residual from sleep yesterday    ROS:  Denies fevers, chills, sweats. No chest pain, palpitations, lightheadedness. Denies coughing, wheezing or shortness of breath. Denies abdominal pain, nausea, diarrhea or constipation. No new areas of joint pain. Denies new areas of numbness or weakness. Denies new anxiety or depression issues. No new skin problems.     Rehabilitation:       PT:  Restrictions/Precautions: Fall Risk  Implants present? : Metal implants   Transfers  Sit to Stand: Stand by assistance  Stand to sit: Stand by assistance  Bed to Chair: Contact guard assistance  Stand Pivot Transfers: Contact guard assistance  Comment: standing no AD  Ambulation 1  Surface: level tile  Device: No Device  Assistance: Contact guard assistance, Maximum assistance  Quality of Gait: 3 Large LOB, 150ft complete with CGA after 150ft pt became confused and out of control falling Max Ax1 to maintain pt from falling to the ground, pt reported not sure why pt lost control  Distance: [id number]  Comments: Pt became confused and started to lose control while amb, pt seem to lose focus, pt reported not sure what happened    Transfers  Sit to Stand: Stand by assistance  Stand to sit: Stand by assistance  Bed to Chair: Contact guard assistance  Stand Pivot Transfers: Contact guard assistance  Comment: standing no AD  Ambulation  Ambulation?: Yes  More Ambulation?: Yes  Ambulation 1  Surface: level tile  Device: No Device  Assistance: Contact guard assistance, Maximum assistance  Quality of Gait: 3 Large LOB, 150ft complete with CGA after 150ft pt became confused and out of control falling Max Ax1 to maintain pt from falling to the ground, pt reported not sure why pt lost control  Distance: [id number]  Comments: Pt became confused and started to lose control while amb, pt seem to lose focus, pt reported not sure what happened    Surface: level tile  Ambulation 1  Surface: level tile  Device: No Device  Assistance: Contact guard assistance, Maximum assistance  Quality of Gait: 3 Large LOB, 150ft complete with CGA after 150ft pt became confused and out of control falling Max Ax1 to maintain pt from falling to the ground, pt reported not sure why pt lost control  Distance: [id number]  Comments: Pt became confused and started to lose control while amb, pt seem to lose focus, pt reported not sure what happened    OT FIM:   Groomin - Independent with all tasks using assistive device  Bathin - Able to bathe all 10 areas with setup/sup/cues (complted shower)  Dressing-Upper: 6 - Independent with device/prosthesis  Dressing-Lower: 6 - Independent with device/prosthesis  Toiletin - Did not occur  Toilet Transfer: 0 - Did not occur  Primary Mode: Shower  Shower Transfer: 5 - Supervision, set-up, cues     Social Interaction: 7 - Patient has appropriate behavior/relations 100% of the time  Problem Solvin - Patient able to solve simple/routine tasks  Memory: 5 - Patient requires prompting with stress/unfamiliar situations       Objective:  /84   Pulse 78   Temp 97.5 °F (36.4 °C)   Resp 18   Ht 5' 10.47\" (1.79 m)   Wt 215 lb 2.7 oz (97.6 kg)   SpO2 95%   BMI 30.46 kg/m²  I Body mass index is 30.46 kg/m². I   Wt Readings from Last 1 Encounters:   18 215 lb 2.7 oz (97.6 kg)      Temp (24hrs), Av.7 °F (36.5 °C), Min:97.5 °F (36.4 °C), Max:97.9 °F (36.6 °C)      Alert, no distress. Good speech and language function. Lungs clear. Heart regular. Abdomen non-distended, non-tender. No calf tenderness or edema. PROM within functional limits. No calf tenderness, edema. Feet warm. Sensation intact, no extinction. Coordination smooth.

## 2018-01-31 NOTE — PLAN OF CARE
Problem: Falls - Risk of  Goal: Absence of falls  Outcome: Met This Shift  No fall this shift. Up with walker.
Problem: Falls - Risk of  Goal: Absence of falls  Outcome: Ongoing  No falls or injury this shift. Bed in low locked position, call light in reach. Assistance provided for transfers and toileting. Problem: Pain:  Goal: Control of acute pain  Control of acute pain   Outcome: Ongoing  Patient assessed for pain, medicated per orders. Patient reports an acceptable level of discomfort with the current medications. Patient was repositioned for comfort.
Problem: Falls - Risk of  Goal: Absence of falls  Outcome: Ongoing  Patient was found on floor after attempting to get up out of his wheelchair on his own.  Patient encouraged to use call light for staff to assist.
Problem: Nutrition  Goal: Optimal nutrition therapy  Outcome: Ongoing  Nutrition Problem: No nutrition diagnosis at this time  Intervention: Food and/or Nutrient Delivery: Continue current diet  Nutritional Goals: Adequate nutrition provision
using AE as needed. Short term goal 2: Pt will complete LB dressing with SBA and Good safety using AE as needed. Short term goal 3: Pt will complete toilet transfer and toileting with SBA and Good safety using appropriate DME. Short term goal 4: Pt will V/D good understanding of fall prevention strategies and task modification for low vision to increase ease and safety with functional ADL's. Short term goal 5: Pt will tolerate standing for 10+ minutes with 0-1 UE support and no LOB while completing a dynamic functional task. Short term goal 6: Pt will actively participate in 30 minutes of therapeutic exercise/activity to promote increased independence and safety with self-care and mobility. Long term goals  Time Frame for Long term goals : By Discharge  Long term goal 1: Pt will complete BADL's with Mod I and Good safety using AE if needed. Long term goal 2: Pt will complete functional transfers with Mod I and Good safety using appropriate DME. Long term goal 3: Pt will complete simple meal prep/light housekeeping tasks with Mod I and Good safety. Plan of Care: Patient to be seen by occupational therapy services 1 Hour 30 Minutes per day at least 5 out of 7 days per week     Anticipated interventions may include ADL and IADL retraining, strengthening, safety education and training, patient/caregiver education and training, equipment evaluation/ training/procurement, neuromuscular reeducation, wheelchair mobility training. SPEECH THERAPY:   Goals:   N/A    CASE MANAGEMENT:  Goals:   Assist patient/family with discharge planning, patient/family counseling,  and coordination with insurance during the inpatient rehabilitation stay. Other members of the multidisciplinary rehabilitation team that will be involved in the patient's plan of care include recreational therapy, dietary, respiratory therapy, and neuropsychology.     Medical issues being managed closely and that require 24 hour availability

## 2018-01-31 NOTE — PROGRESS NOTES
UBE 5 min forward & 5 min backward  Other exercises 5: Supine bilat. LE ex  x20 reps; 2#  Other exercises 6: Standing tapping balloon with bilat UE unsupported standing 10 min. Activity Tolerance: Patient Tolerated treatment well, Treatment limited secondary to medical complications (free text) (pt appeared to lose focus, closer look pt demo Nystagmus)  PT Equipment Recommendations  Equipment Needed: No       Patient Education  New Education Provided: POC  Learner:patient  Method: demonstration and explanation       Outcome: acknowledged understanding of POC and needs reinforcement     Current Treatment Recommendations: Balance Training, Functional Mobility Training, Transfer Training, Endurance Training, Gait Training, Stair training, Home Exercise Program, Safety Education & Training, Patient/Caregiver Education & Training, Equipment Evaluation, Education, & procurement    Conditions Requiring Skilled Therapeutic Intervention  Body structures, Functions, Activity limitations: Decreased functional mobility ; Decreased safe awareness;Decreased balance;Decreased vision/visual deficit; Decreased endurance  Treatment Diagnosis: CVA  Prognosis: Good  Patient Education: POC  Barriers to Learning: vision  REQUIRES PT FOLLOW UP: Yes  Discharge Recommendations: Home with Home health PT;Home with assist PRN    Goals  Short term goals  Time Frame for Short term goals: 5 days  Short term goal 1: Increase balance to good with RW  Short term goal 2: Improve bed mob to IND  Short term goal 3: Improve transfers to SBA  Long term goals  Time Frame for Long term goals : 10 days   Long term goal 1: Increase standing balance to good with least restricitve device to increase safety at home  Long term goal 2:  Increase endurance to good to progress PT sessions   Long term goal 3: Improve bed mobility to IND from flat bed to prepare for home  Long term goal 4: Improve transfers with least restrictive device to MOD IND to prepare

## 2018-02-01 LAB
ANION GAP SERPL CALCULATED.3IONS-SCNC: 11 MMOL/L (ref 9–17)
BUN BLDV-MCNC: 16 MG/DL (ref 8–23)
BUN/CREAT BLD: ABNORMAL (ref 9–20)
CALCIUM SERPL-MCNC: 9 MG/DL (ref 8.6–10.4)
CHLORIDE BLD-SCNC: 94 MMOL/L (ref 98–107)
CO2: 28 MMOL/L (ref 20–31)
CREAT SERPL-MCNC: 1.05 MG/DL (ref 0.7–1.2)
GFR AFRICAN AMERICAN: >60 ML/MIN
GFR NON-AFRICAN AMERICAN: >60 ML/MIN
GFR SERPL CREATININE-BSD FRML MDRD: ABNORMAL ML/MIN/{1.73_M2}
GFR SERPL CREATININE-BSD FRML MDRD: ABNORMAL ML/MIN/{1.73_M2}
GLUCOSE BLD-MCNC: 102 MG/DL (ref 70–99)
HCT VFR BLD CALC: 40.9 % (ref 41–53)
HEMOGLOBIN: 13.9 G/DL (ref 13.5–17.5)
MCH RBC QN AUTO: 33.6 PG (ref 26–34)
MCHC RBC AUTO-ENTMCNC: 34.1 G/DL (ref 31–37)
MCV RBC AUTO: 98.7 FL (ref 80–100)
NRBC AUTOMATED: ABNORMAL PER 100 WBC
PDW BLD-RTO: 14.4 % (ref 11.5–14.9)
PLATELET # BLD: 261 K/UL (ref 150–450)
PMV BLD AUTO: 7.1 FL (ref 6–12)
POTASSIUM SERPL-SCNC: 4 MMOL/L (ref 3.7–5.3)
RBC # BLD: 4.14 M/UL (ref 4.5–5.9)
SODIUM BLD-SCNC: 133 MMOL/L (ref 135–144)
WBC # BLD: 5.1 K/UL (ref 3.5–11)

## 2018-02-01 PROCEDURE — 36415 COLL VENOUS BLD VENIPUNCTURE: CPT

## 2018-02-01 PROCEDURE — 1180000000 HC REHAB R&B

## 2018-02-01 PROCEDURE — 97150 GROUP THERAPEUTIC PROCEDURES: CPT

## 2018-02-01 PROCEDURE — 97116 GAIT TRAINING THERAPY: CPT

## 2018-02-01 PROCEDURE — 6370000000 HC RX 637 (ALT 250 FOR IP): Performed by: FAMILY MEDICINE

## 2018-02-01 PROCEDURE — 97530 THERAPEUTIC ACTIVITIES: CPT

## 2018-02-01 PROCEDURE — 99232 SBSQ HOSP IP/OBS MODERATE 35: CPT | Performed by: PHYSICAL MEDICINE & REHABILITATION

## 2018-02-01 PROCEDURE — 6360000002 HC RX W HCPCS: Performed by: FAMILY MEDICINE

## 2018-02-01 PROCEDURE — 85027 COMPLETE CBC AUTOMATED: CPT

## 2018-02-01 PROCEDURE — 80048 BASIC METABOLIC PNL TOTAL CA: CPT

## 2018-02-01 PROCEDURE — 97535 SELF CARE MNGMENT TRAINING: CPT

## 2018-02-01 PROCEDURE — 97110 THERAPEUTIC EXERCISES: CPT

## 2018-02-01 RX ADMIN — GABAPENTIN 300 MG: 300 CAPSULE ORAL at 07:19

## 2018-02-01 RX ADMIN — ENOXAPARIN SODIUM 40 MG: 40 INJECTION SUBCUTANEOUS at 07:20

## 2018-02-01 RX ADMIN — LOSARTAN POTASSIUM 25 MG: 25 TABLET ORAL at 07:20

## 2018-02-01 RX ADMIN — GABAPENTIN 300 MG: 300 CAPSULE ORAL at 20:02

## 2018-02-01 RX ADMIN — CLOPIDOGREL BISULFATE 75 MG: 75 TABLET ORAL at 07:20

## 2018-02-01 RX ADMIN — ASPIRIN 81 MG: 81 TABLET ORAL at 07:20

## 2018-02-01 RX ADMIN — ATORVASTATIN CALCIUM 40 MG: 40 TABLET, FILM COATED ORAL at 20:02

## 2018-02-01 RX ADMIN — LOSARTAN POTASSIUM 25 MG: 25 TABLET ORAL at 20:02

## 2018-02-01 RX ADMIN — BUDESONIDE AND FORMOTEROL FUMARATE DIHYDRATE 2 PUFF: 160; 4.5 AEROSOL RESPIRATORY (INHALATION) at 20:02

## 2018-02-01 RX ADMIN — BUDESONIDE AND FORMOTEROL FUMARATE DIHYDRATE 2 PUFF: 160; 4.5 AEROSOL RESPIRATORY (INHALATION) at 07:20

## 2018-02-01 RX ADMIN — CARVEDILOL 25 MG: 25 TABLET ORAL at 07:19

## 2018-02-01 RX ADMIN — PANTOPRAZOLE SODIUM 40 MG: 40 TABLET, DELAYED RELEASE ORAL at 06:28

## 2018-02-01 RX ADMIN — Medication 325 MG: at 07:19

## 2018-02-01 NOTE — PROGRESS NOTES
practicequestionable decreased BP medications on Coreg and losarten twice a day  5. Hyponatremiasodium 133 todayoff fluid restrictions  6. GERDProtonix  7. Anemiairon  8. Questionable meloxicamchronic, patient is own medicationwill DC as creatinine was up to 1.2monitordiscussed with patient  9. NeuropathyNeurontin  10. COPDSymbicort, Spiriva,daliresp  11. DVT prophylaxisLovenox, EPC cuffs, Bj's  12. Family practice for medical management- Dr Duran Aiken  13. Discharge plan 2/3/2018 if okay with [facility], follow up with 1. PCP 2. Neurology 3. Rehab, home PT/OT, no driving, CBC/BMP 1 week    Alin Olmstead MD       This note is created with the assistance of a speech recognition program.  While intending to generate a document that actually reflects the content of the visit, the document can still have some errors including those of syntax and sound a like substitutions which may escape proof reading.   In such instances, actual meaning can be extrapolated by contextual diversion

## 2018-02-01 NOTE — PROGRESS NOTES
[facility]    ACUTE REHABILITATION OCCUPATIONAL THERAPY  DAILY NOTE    Date: 18  Patient Name: Ericka Zazueta      Room: 7296/4278-93    MRN: [id number]   : 1940  (73 y.o.)  Gender: male   Referring Practitioner: Dr. Collette Human  Diagnosis: CVA - R temporal and occiptal infarcts       Restrictions  Restrictions/Precautions: Fall Risk  Implants present? : Metal implants     Subjective  Subjective: \"My family will be a phone call away. I hold down 4 and theyre there. \"   Comments: Pt referring to speed dial and family able to A upon calling them. Writer attempting to increase insight to pt d/t decreased balance/vision and blood pressure changes. Pt c limited insight. Patient Currently in Pain: Denies  Restrictions/Precautions: Fall Risk  Overall Orientation Status: Within Functional Limits  Patient Observation  Observations: Pt decreased balance and requiring increased A c transfers and mobility as ADL tx progresses. Pt began at CGA/SBA but required Mod/MAx A to maintain standing balance toward completion of tx. Pt required multiple seated rest breaks d/t dizziness and visual discrepancies. RN aware.      Objective  Cognition  Overall Cognitive Status: Impaired  Safety Judgement: Decreased awareness of need for assistance (decreased understanding of 24Hr A; pt is unsteady)  Perception  Overall Perceptual Status: WFL  Balance  Sitting Balance: Modified independent  (static: indep  dynamic: Mod.I;CGA on TTB d/t L lean/dizzness)  Standing Balance: Contact guard assistance (req Mod A to maintain upright 2x during mobility today, )  Bed mobility  Rolling to Left: Supervision  Rolling to Right: Supervision  Supine to Sit: Supervision  Sit to Supine: Supervision  Scooting: Supervision  Transfers  Stand Step Transfers: Stand by assistance (RW)  Stand Pivot Transfers: Contact guard assistance  Sit to stand: Independent  Stand to sit: Stand by assistance  Transfer Comments: w/RW  Standing

## 2018-02-02 PROCEDURE — 97150 GROUP THERAPEUTIC PROCEDURES: CPT

## 2018-02-02 PROCEDURE — 6370000000 HC RX 637 (ALT 250 FOR IP): Performed by: FAMILY MEDICINE

## 2018-02-02 PROCEDURE — 97530 THERAPEUTIC ACTIVITIES: CPT

## 2018-02-02 PROCEDURE — 97112 NEUROMUSCULAR REEDUCATION: CPT

## 2018-02-02 PROCEDURE — 97116 GAIT TRAINING THERAPY: CPT

## 2018-02-02 PROCEDURE — 97535 SELF CARE MNGMENT TRAINING: CPT

## 2018-02-02 PROCEDURE — 97110 THERAPEUTIC EXERCISES: CPT

## 2018-02-02 PROCEDURE — 1180000000 HC REHAB R&B

## 2018-02-02 PROCEDURE — 99232 SBSQ HOSP IP/OBS MODERATE 35: CPT | Performed by: PHYSICAL MEDICINE & REHABILITATION

## 2018-02-02 PROCEDURE — 6360000002 HC RX W HCPCS: Performed by: FAMILY MEDICINE

## 2018-02-02 RX ORDER — ASPIRIN 81 MG/1
81 TABLET ORAL DAILY
Qty: 30 TABLET | Refills: 3 | Status: SHIPPED | OUTPATIENT
Start: 2018-02-03 | End: 2018-03-12 | Stop reason: ALTCHOICE

## 2018-02-02 RX ORDER — LOSARTAN POTASSIUM 25 MG/1
25 TABLET ORAL 2 TIMES DAILY
Qty: 30 TABLET | Refills: 3 | Status: SHIPPED | OUTPATIENT
Start: 2018-02-02

## 2018-02-02 RX ORDER — CLOPIDOGREL BISULFATE 75 MG/1
75 TABLET ORAL DAILY
Qty: 30 TABLET | Refills: 3 | Status: SHIPPED | OUTPATIENT
Start: 2018-02-03

## 2018-02-02 RX ORDER — POLYETHYLENE GLYCOL 3350 17 G/17G
17 POWDER, FOR SOLUTION ORAL DAILY
Qty: 527 G | Refills: 1 | Status: SHIPPED | OUTPATIENT
Start: 2018-02-03 | End: 2018-03-05

## 2018-02-02 RX ORDER — ATORVASTATIN CALCIUM 40 MG/1
40 TABLET, FILM COATED ORAL NIGHTLY
Qty: 30 TABLET | Refills: 3 | Status: SHIPPED | OUTPATIENT
Start: 2018-02-02

## 2018-02-02 RX ORDER — CARVEDILOL 6.25 MG/1
6.25 TABLET ORAL 2 TIMES DAILY WITH MEALS
Qty: 60 TABLET | Refills: 3 | Status: SHIPPED | OUTPATIENT
Start: 2018-02-02

## 2018-02-02 RX ORDER — CARVEDILOL 6.25 MG/1
6.25 TABLET ORAL 2 TIMES DAILY WITH MEALS
Status: DISCONTINUED | OUTPATIENT
Start: 2018-02-02 | End: 2018-02-03 | Stop reason: HOSPADM

## 2018-02-02 RX ADMIN — PANTOPRAZOLE SODIUM 40 MG: 40 TABLET, DELAYED RELEASE ORAL at 06:23

## 2018-02-02 RX ADMIN — BUDESONIDE AND FORMOTEROL FUMARATE DIHYDRATE 2 PUFF: 160; 4.5 AEROSOL RESPIRATORY (INHALATION) at 20:07

## 2018-02-02 RX ADMIN — LOSARTAN POTASSIUM 25 MG: 25 TABLET ORAL at 20:05

## 2018-02-02 RX ADMIN — Medication 325 MG: at 08:26

## 2018-02-02 RX ADMIN — GABAPENTIN 300 MG: 300 CAPSULE ORAL at 08:26

## 2018-02-02 RX ADMIN — CARVEDILOL 6.25 MG: 6.25 TABLET, FILM COATED ORAL at 17:06

## 2018-02-02 RX ADMIN — ENOXAPARIN SODIUM 40 MG: 40 INJECTION SUBCUTANEOUS at 08:26

## 2018-02-02 RX ADMIN — CARVEDILOL 6.25 MG: 6.25 TABLET, FILM COATED ORAL at 08:26

## 2018-02-02 RX ADMIN — GABAPENTIN 300 MG: 300 CAPSULE ORAL at 20:05

## 2018-02-02 RX ADMIN — ATORVASTATIN CALCIUM 40 MG: 40 TABLET, FILM COATED ORAL at 20:05

## 2018-02-02 RX ADMIN — BUDESONIDE AND FORMOTEROL FUMARATE DIHYDRATE 2 PUFF: 160; 4.5 AEROSOL RESPIRATORY (INHALATION) at 08:26

## 2018-02-02 RX ADMIN — CLOPIDOGREL BISULFATE 75 MG: 75 TABLET ORAL at 08:26

## 2018-02-02 RX ADMIN — ASPIRIN 81 MG: 81 TABLET ORAL at 08:24

## 2018-02-02 RX ADMIN — LOSARTAN POTASSIUM 25 MG: 25 TABLET ORAL at 08:26

## 2018-02-02 NOTE — PROGRESS NOTES
Physical Medicine & Rehabilitation  Progress Note    2/2/2018 10:01 AM     CC: Ambulatory and ADL dysfunction due to CVA with acute medial right temporal and right occipital lobe infarcts    Subjective:   Feels well. No complaints. Bowel/bladder okay. No pain. Feels better today. ROS:  Denies fevers, chills, sweats. No chest pain, palpitations, lightheadedness. Denies coughing, wheezing or shortness of breath. Denies abdominal pain, nausea, diarrhea or constipation. No new areas of joint pain. Denies new areas of numbness or weakness. Denies new anxiety or depression issues. No new skin problems.     Rehabilitation:       PT:  Restrictions/Precautions: Fall Risk  Implants present? : Metal implants   Transfers  Sit to Stand: Contact guard assistance (Posterior LOB upon standing)  Stand to sit: Stand by assistance  Bed to Chair: Contact guard assistance  Stand Pivot Transfers: Contact guard assistance  Comment: standing no AD  Ambulation 1  Surface: level tile  Device: No Device  Assistance: Minimal assistance, Moderate assistance  Quality of Gait: LOB anterior, ataxic at times, pt loses focus and will continue with fluctuating pace  Distance: 200ft  Comments: Improved control today vs yesterday, however still LOB and inconsistent pace    Transfers  Sit to Stand: Contact guard assistance (Posterior LOB upon standing)  Stand to sit: Stand by assistance  Bed to Chair: Contact guard assistance  Stand Pivot Transfers: Contact guard assistance  Comment: standing no AD  Ambulation  Ambulation?: Yes  More Ambulation?: Yes  Ambulation 1  Surface: level tile  Device: No Device  Assistance: Minimal assistance, Moderate assistance  Quality of Gait: LOB anterior, ataxic at times, pt loses focus and will continue with fluctuating pace  Distance: 200ft  Comments: Improved control today vs yesterday, however still LOB and inconsistent pace    Surface: level tile  Ambulation 1  Surface: level tile  Device: No Roflumilast  500 mcg Oral Daily    tiotropium  18 mcg Inhalation Daily    polyethylene glycol  17 g Oral Daily    aspirin  81 mg Oral Daily    gabapentin  300 mg Oral BID    pantoprazole  40 mg Oral QAM AC    budesonide-formoterol  2 puff Inhalation BID     Continuous Infusions:   PRN Meds:.acetaminophen, magnesium hydroxide, bisacodyl     Diagnostics:     CBC:   Recent Labs      02/01/18   0638   WBC  5.1   RBC  4.14*   HGB  13.9   HCT  40.9*   MCV  98.7   RDW  14.4   PLT  261     BMP:   Recent Labs      02/01/18   0638   NA  133*   K  4.0   CL  94*   CO2  28   BUN  16   CREATININE  1.05     BNP: No results for input(s): BNP in the last 72 hours. PT/INR: No results for input(s): PROTIME, INR in the last 72 hours. APTT: No results for input(s): APTT in the last 72 hours. CARDIAC ENZYMES: No results for input(s): CKMB, CKMBINDEX, TROPONINT in the last 72 hours. Invalid input(s): CKTOTAL;3  FASTING LIPID PANEL:  Lab Results   Component Value Date    CHOL 170 01/22/2018    HDL 49 01/22/2018    TRIG 60 01/22/2018     LIVER PROFILE: No results for input(s): AST, ALT, ALB, BILIDIR, BILITOT, ALKPHOS in the last 72 hours. I/O (24Hr): No intake or output data in the 24 hours ending 02/02/18 1001    Glu last 24 hour  No results for input(s): POCGLU in the last 72 hours. No results for input(s): CLARITYU, COLORU, PHUR, SPECGRAV, PROTEINU, RBCUA, BLOODU, BACTERIA, NITRU, WBCUA, LEUKOCYTESUR, YEAST, GLUCOSEU, BILIRUBINUR in the last 72 hours. Impression/Plan:    1.  Ambulatory and ADL dysfunction secondary to CVA with acute medial right temporal and right occipital lobe infarctscontinue rehab efforts, progressing well,discharge plan Feb 3, 2018Again reviewed recommendation for 24/7 supervision due to risk of fallst  This was reviewed with patient and he  Now notes he will try to have 24/7reviewed concerns of falls, however patient insists on discharge tomorrow aware of risk of falls, fracture, hitting

## 2018-02-02 NOTE — PROGRESS NOTES
person, place, and time  Chest - clear to auscultation, no wheezes, rales or rhonchi, symmetric air entry  Heart - normal rate, regular rhythm, normal S1, S2, no murmurs, rubs, clicks or gallops  Abdomen - soft, nontender, nondistended, no masses or organomegaly  Neurological - alert, oriented, normal speech, no focal findings or movement disorder noted}  Extremities - peripheral pulses normal, no pedal edema, no clubbing or cyanosis  Skin - normal coloration and turgor, no rashes, no suspicious skin lesions noted     Labs:-    CBC:   Recent Labs      02/01/18   0638   WBC  5.1   HGB  13.9   PLT  261     BMP:    Recent Labs      02/01/18   0638   NA  133*   K  4.0   CL  94*   CO2  28   BUN  16   CREATININE  1.05   GLUCOSE  102*     Glucose:No results for input(s): POCGLU in the last 72 hours. HgbA1C: No results for input(s): LABA1C in the last 72 hours. INR: No results for input(s): INR in the last 72 hours. CARDIAC ENZYMES:No results for input(s): CKTOTAL, CKMB, CKMBINDEX, TROPONINI in the last 72 hours. BNP: No results for input(s): BNP in the last 72 hours. Lipids: No results for input(s): CHOL, TRIG, HDL, LDLCALC in the last 72 hours.     Invalid input(s): LDL  ABGs: No results found for: PH, PCO2, PO2, HCO3, O2SAT  Thyroid:   Lab Results   Component Value Date    TSH 0.58 01/22/2018      Urinalysis:   Color, UA   Date Value Ref Range Status   01/22/2018 YELLOW YEL Final     pH, UA   Date Value Ref Range Status   01/22/2018 6.5 5.0 - 8.0 Final     Specific Gravity, UA   Date Value Ref Range Status   01/22/2018 1.009 1.000 - 1.030 Final     Protein, UA   Date Value Ref Range Status   01/22/2018 NEGATIVE NEG Final     Nitrite, Urine   Date Value Ref Range Status   01/22/2018 NEGATIVE NEG Final     Leukocyte Esterase, Urine   Date Value Ref Range Status   01/22/2018 NEGATIVE NEG Final     Glucose, Ur   Date Value Ref Range Status   01/22/2018 NEGATIVE NEG Final     Bilirubin Urine   Date Value Ref Range Status

## 2018-02-02 NOTE — PROGRESS NOTES
cues (simulated)    Social Interaction: 7 - Patient has appropriate behavior/relations 100% of the time  Problem Solvin - Independent with device (e.g. notes, schedules)  Memory: 6 - Patient requires device to recall (e.g. memory book)    Assessment  Performance deficits / Impairments: Decreased functional mobility ; Decreased ADL status; Decreased balance;Decreased endurance  Assessment: pt c increased understanding this date for need for A; increased tolerance and steadiness noted  Prognosis: Good  Discharge Recommendations: Home with Home health OT;24 hour supervision or assist  Activity Tolerance: Patient Tolerated treatment well  Safety Devices in place: Yes  Type of devices: Nurse notified; Left in chair;Call light within reach  Restraints  Initially in place: No  Equipment Recommendations  Equipment Needed: Yes (continue to assess)  Transfer Tub Bench: X       Patient Education:  Patient Goals   Patient goals : Pt wants to return home with family A/support as needed. Patient Education: ADL's , mobility safety, visual deficits, TTB, fall prevention  Learner:patient  Method: demonstration, explanation and handout       Outcome: demonstrated understanding and needs reinforcement for need for S c mobility until balance increases     Plan  Plan  Times per week: 5-7  Times per day: Twice a day  Current Treatment Recommendations: Balance Training, Functional Mobility Training, Endurance Training, Safety Education & Training, Patient/Caregiver Education & Training, Equipment Evaluation, Education, & procurement, Self-Care / ADL, Neuromuscular Re-education, Home Management Training  Patient Goals   Patient goals : Pt wants to return home with family A/support as needed. Short term goals  Time Frame for Short term goals: One Week  Short term goal 1: Pt will complete bathing with SBA and Good safety using AE as needed.   Short term goal 2: Pt will complete LB dressing with SBA and Good safety using AE as

## 2018-02-02 NOTE — CARE COORDINATION
Writer spoke to the pt dtrdonnie d/jacey ramos. He expressed the staff concern about the pt home alone at times . She stated someone will look in home all day.  Writer did give the pt lifeline coupon

## 2018-02-03 VITALS
WEIGHT: 215.17 LBS | OXYGEN SATURATION: 97 % | BODY MASS INDEX: 30.8 KG/M2 | HEIGHT: 70 IN | RESPIRATION RATE: 18 BRPM | SYSTOLIC BLOOD PRESSURE: 145 MMHG | DIASTOLIC BLOOD PRESSURE: 69 MMHG | HEART RATE: 64 BPM | TEMPERATURE: 97.7 F

## 2018-02-03 PROCEDURE — 97116 GAIT TRAINING THERAPY: CPT

## 2018-02-03 PROCEDURE — 97530 THERAPEUTIC ACTIVITIES: CPT

## 2018-02-03 PROCEDURE — 6360000002 HC RX W HCPCS: Performed by: FAMILY MEDICINE

## 2018-02-03 PROCEDURE — 97535 SELF CARE MNGMENT TRAINING: CPT

## 2018-02-03 PROCEDURE — 99232 SBSQ HOSP IP/OBS MODERATE 35: CPT | Performed by: PHYSICAL MEDICINE & REHABILITATION

## 2018-02-03 PROCEDURE — 97110 THERAPEUTIC EXERCISES: CPT

## 2018-02-03 PROCEDURE — 6370000000 HC RX 637 (ALT 250 FOR IP): Performed by: FAMILY MEDICINE

## 2018-02-03 PROCEDURE — 6370000000 HC RX 637 (ALT 250 FOR IP): Performed by: PHYSICAL MEDICINE & REHABILITATION

## 2018-02-03 RX ADMIN — BUDESONIDE AND FORMOTEROL FUMARATE DIHYDRATE 2 PUFF: 160; 4.5 AEROSOL RESPIRATORY (INHALATION) at 07:35

## 2018-02-03 RX ADMIN — Medication 325 MG: at 07:36

## 2018-02-03 RX ADMIN — ASPIRIN 81 MG: 81 TABLET ORAL at 07:37

## 2018-02-03 RX ADMIN — CLOPIDOGREL BISULFATE 75 MG: 75 TABLET ORAL at 07:32

## 2018-02-03 RX ADMIN — CARVEDILOL 6.25 MG: 6.25 TABLET, FILM COATED ORAL at 07:32

## 2018-02-03 RX ADMIN — POLYETHYLENE GLYCOL 3350 17 G: 17 POWDER, FOR SOLUTION ORAL at 07:32

## 2018-02-03 RX ADMIN — ENOXAPARIN SODIUM 40 MG: 40 INJECTION SUBCUTANEOUS at 07:33

## 2018-02-03 RX ADMIN — LOSARTAN POTASSIUM 25 MG: 25 TABLET ORAL at 07:32

## 2018-02-03 RX ADMIN — PANTOPRAZOLE SODIUM 40 MG: 40 TABLET, DELAYED RELEASE ORAL at 06:44

## 2018-02-03 RX ADMIN — GABAPENTIN 300 MG: 300 CAPSULE ORAL at 07:33

## 2018-02-03 NOTE — DISCHARGE SUMMARY
7.1 02/01/2018     BMP:    Lab Results   Component Value Date     02/01/2018    K 4.0 02/01/2018    CL 94 02/01/2018    CO2 28 02/01/2018    BUN 16 02/01/2018    LABALBU 3.5 01/22/2018    CREATININE 1.05 02/01/2018    CALCIUM 9.0 02/01/2018    GFRAA >60 02/01/2018    LABGLOM >60 02/01/2018    GLUCOSE 102 02/01/2018     PT/INR:  No results found for: PROTIME, INR      Patient Instructions:    1. PCPDr. Lyubov Lima 2. Neurology- [address] [place] [address], [place] PT/OT, no driving, CBC/BMP 1 week, recommend 24/7 supervisionabove discussed with patient. Patient to follow up with PCP in 1-2 weeks. Medications, precautions and follow up reviewed with patient and family    Follow-up visits: See after visit summary from hospitalization    Discharge Medications:   Atif Galo   [facility] Medication Instructions ZIO:[id number]    Printed on:02/03/18 8505   Medication Information                      aspirin 81 MG EC tablet  Take 1 tablet by mouth daily             atorvastatin (LIPITOR) 40 MG tablet  Take 1 tablet by mouth nightly             budesonide-formoterol (SYMBICORT) 160-4.5 MCG/ACT AERO  Inhale 2 puffs into the lungs 2 times daily             carvedilol (COREG) 6.25 MG tablet  Take 1 tablet by mouth 2 times daily (with meals)             clopidogrel (PLAVIX) 75 MG tablet  Take 1 tablet by mouth daily             ferrous sulfate 325 (65 Fe) MG tablet  Take 325 mg by mouth daily (with breakfast)             gabapentin (NEURONTIN) 300 MG capsule  Take 300 mg by mouth 2 times daily .              losartan (COZAAR) 25 MG tablet  Take 1 tablet by mouth 2 times daily             omeprazole (PRILOSEC) 20 MG delayed release capsule  Take 20 mg by mouth Daily             polyethylene glycol (GLYCOLAX) packet  Take 17 g by mouth daily             Roflumilast (DALIRESP) 500 MCG tablet  Take 500 mcg by mouth daily             tiotropium (SPIRIVA) 18 MCG inhalation capsule  Inhale 18 mcg into the lungs daily

## 2018-02-03 NOTE — PROGRESS NOTES
SW referred pt to [facility] for home care. MARIBELL spoke to Dave at [facility]. They will see pt tomorrow.

## 2018-02-03 NOTE — PROGRESS NOTES
Ambulation 1  Surface: level tile  Device: No Device  Assistance: Minimal assistance; Moderate assistance  Quality of Gait: LOB anterior, ataxic at times, pt loses focus and will continue with fluctuating pace  Distance: 200ft  Comments: Improved control today vs yesterday, however still LOB and inconsistent pa     OT FIM:   Groomin - Independent with all tasks using assistive device  Bathin - Able to bathe all 10 areas with setup/sup/cues (S in standing tasks)  Dressing-Upper: 6 - Independent with device/prosthesis  Dressing-Lower: 6 - Independent with device/prosthesis  Toiletin - Requires device (grab bar/walker/etc.)  Toilet Transfer: 6 - Independent with device (grab bar/walker/slide bar)  Primary Mode: Shower  Tub Transfer: 0 - Activity does not occur  Shower Transfer: 5 - Supervision, set-up, cues (simulated)     Social Interaction: 7 - Patient has appropriate behavior/relations 100% of the time  Problem Solvin - Independent with device (e.g. notes, schedules)  Memory: 6 - Patient requires device to recall (e.g. memory book)    Objective:  BP (!) 145/69   Pulse 64   Temp 97.7 °F (36.5 °C) (Oral)   Resp 18   Ht 5' 10.47\" (1.79 m)   Wt 215 lb 2.7 oz (97.6 kg)   SpO2 97%   BMI 30.46 kg/m²  I Body mass index is 30.46 kg/m². I   Wt Readings from Last 1 Encounters:   18 215 lb 2.7 oz (97.6 kg)      Temp (24hrs), Av.6 °F (36.4 °C), Min:97.5 °F (36.4 °C), Max:97.7 °F (36.5 °C)    Alert, no distress. Good speech and language function. Lungs clear, no wheezing. Heart regular. Abdomen non-distended, non-tender. No calf tenderness or edema. No calf tenderness, edema. Sensation intact. Motor testing 4+/5 upper/lower extremities.         Medications   Scheduled Meds:   carvedilol  6.25 mg Oral BID WC    enoxaparin  40 mg Subcutaneous Daily    atorvastatin  40 mg Oral Nightly    clopidogrel  75 mg Oral Daily    ferrous sulfate  325 mg Oral Daily with breakfast    losartan decisions. 2. CVAon statin and Plavix and aspirin. 3. History of balance dysfunction monitor. 4. HypertensionCoreg and Cozaar. Cleared by family practice for discharge today. 5. Hyponatremiasodium 133 on 2/1/18. 6. GERDProtonix  7. COPD- CPM  8. DVT px- on lovenox. 9. FP for medical management Dr. Taina Leiva. 10. Anemia- 13.9 on 2/1/18. 11. FP of medical management. 12. NeuropathyNeurontin. 13. Discharge plan 2/3/2018  okay with family practice, follow up with 1. PCPDrRaquel Peters 2. Neurology- [address] [place] [address], [place] PT/OT, no driving, CBC/BMP 1 week, recommend 24/7 supervisionabove has been discussed with patient. Rosalio Beltran MD       This note is created with the assistance of a speech recognition program.  While intending to generate a document that actually reflects the content of the visit, the document can still have some errors including those of syntax and sound a like substitutions which may escape proof reading.   In such instances, actual meaning can be extrapolated by contextual diversion

## 2018-02-03 NOTE — PROGRESS NOTES
today; pt c increased understanding this date for need for A; increased tolerance and steadiness noted  Prognosis: Good  Discharge Recommendations: Home with Home health OT;24 hour supervision or assist  Activity Tolerance: Patient Tolerated treatment well  Safety Devices in place: Yes  Type of devices: Nurse notified; Left in chair;Call light within reach  Restraints  Initially in place: No  Equipment Recommendations  Equipment Needed: Yes (continue to assess)  Transfer Tub Bench: X       Patient Education:  Patient Goals   Patient goals : Pt wants to return home with family A/support as needed. Patient Education: ADL's , mobility safety, visual deficits, TTB, fall prevention, driving restrictions  Learner:patient  Method: demonstration, explanation and handout       Outcome: demonstrated understanding     Plan  Plan  Times per week: 5-7  Times per day: Twice a day  Current Treatment Recommendations: Balance Training, Functional Mobility Training, Endurance Training, Safety Education & Training, Patient/Caregiver Education & Training, Equipment Evaluation, Education, & procurement, Self-Care / ADL, Neuromuscular Re-education, Home Management Training  Patient Goals   Patient goals : Pt wants to return home with family A/support as needed. Short term goals  Time Frame for Short term goals: One Week  Short term goal 1: Pt will complete bathing with SBA and Good safety using AE as needed. Short term goal 2: Pt will complete LB dressing with SBA and Good safety using AE as needed. Short term goal 3: Pt will complete toilet transfer and toileting with SBA and Good safety using appropriate DME. Short term goal 4: Pt will V/D good understanding of fall prevention strategies and task modification for low vision to increase ease and safety with functional ADL's. Short term goal 5: Pt will tolerate standing for 10+ minutes with 0-1 UE support and no LOB while completing a dynamic functional task.   Short term goal 6: Pt

## 2018-02-03 NOTE — PROGRESS NOTES
ok to ambulated short distances in the home without device. Stairs/Curb  Stairs?: Yes  Stairs  # Steps : 12  Stairs Height: 6\"  Rails: Bilateral  Device: No Device  Assistance: Supervision  Comment: Ascending FWD and descending BWD. Patient reports he goes down steps BWD at home due to wearing bifocals. FIMS:      Transfers  Bed, Chair, Wheel Chair: 6 - Requires assistive device (slide rail)   Locomotion  Primary Mode: Walk  Distance Walked: 200 ft  Walk: 5 - Supervision Requires standby supervision or cuing to walk/operate wheelchair at least 150 feet  Stairs: 5- Supervision Requires supervision(e.g., standing by, cuing, or coaxing) to go up and down one flight of stairs       BALANCE Posture: Good  Sitting - Static: Good  Sitting - Dynamic: Good  Standing - Static: Good;-  Standing - Dynamic: Fair;+  Comments: standing no AD    EXERCISES    Other exercises 1: Seated bilat. LE ex. x15 reps 21/2 lbs  Other exercises 2: Seated green TB ex x 15 reps  Other exercises 4: NuStep 15 mins. L4              PT Equipment Recommendations  Equipment Needed: No       Patient Education  New Education Provided: Written HEP reviewed with good understanding. Discussed need to use RW or St. cane when ambulating in community or when fatigued to prevent falls. Learner:patient  Method: explanation and handout       Outcome: demonstrated understanding     Current Treatment Recommendations: Balance Training, Functional Mobility Training, Transfer Training, Endurance Training, Gait Training, Stair training, Home Exercise Program, Safety Education & Training, Patient/Caregiver Education & Training, Equipment Evaluation, Education, & procurement    Conditions Requiring Skilled Therapeutic Intervention  Body structures, Functions, Activity limitations: Decreased functional mobility ; Decreased safe awareness;Decreased balance;Decreased vision/visual deficit; Decreased endurance  Assessment: Mild ataxia noted with self-correct. Continue mild imbalances with challenging activities. Patient does best with RW for long distance ambulation. Treatment Diagnosis: CVA  Prognosis: Good  Patient Education: Written HEP reviewed with good understanding. Discussed need to use RW or St. cane when ambulating in community or when fatigued to prevent falls. REQUIRES PT FOLLOW UP: Yes  Discharge Recommendations: Home with Home health PT;Home with assist PRN    Goals  Short term goals  Time Frame for Short term goals: 5 days  Short term goal 1: Increase balance to good with RW  Short term goal 2: Improve bed mob to IND  Short term goal 3: Improve transfers to SBA  Long term goals  Time Frame for Long term goals : 10 days   Long term goal 1: Increase standing balance to good with least restricitve device to increase safety at home  Long term goal 2:  Increase endurance to good to progress PT sessions   Long term goal 3: Improve bed mobility to IND from flat bed to prepare for home  Long term goal 4: Improve transfers with least restrictive device to MOD IND to prepare pt to be home alone   Long term goal 5: Improve gait with least restrictive device to MOD  ft to prepare for home  Long term goal 6: Improve stair ambulation with rail use to IND up/down 4 steps to allow pt to access home       02/03/18 1030   PT Individual Minutes   Time In 1030   Time Out 1100   Minutes 30       Electronically signed by Brianna Puga PTA on 2/3/18 at 11:24 AM

## 2018-03-12 ENCOUNTER — OFFICE VISIT (OUTPATIENT)
Dept: PHYSICAL MEDICINE AND REHAB | Age: 78
End: 2018-03-12
Payer: MEDICARE

## 2018-03-12 VITALS
SYSTOLIC BLOOD PRESSURE: 121 MMHG | BODY MASS INDEX: 29.59 KG/M2 | DIASTOLIC BLOOD PRESSURE: 67 MMHG | WEIGHT: 209 LBS | HEART RATE: 80 BPM | TEMPERATURE: 97.7 F

## 2018-03-12 DIAGNOSIS — H25.013 CORTICAL AGE-RELATED CATARACT OF BOTH EYES: ICD-10-CM

## 2018-03-12 DIAGNOSIS — I63.511 CEREBROVASCULAR ACCIDENT (CVA) DUE TO OCCLUSION OF RIGHT MIDDLE CEREBRAL ARTERY (HCC): Primary | ICD-10-CM

## 2018-03-12 DIAGNOSIS — I10 ESSENTIAL HYPERTENSION: ICD-10-CM

## 2018-03-12 PROCEDURE — 99213 OFFICE O/P EST LOW 20 MIN: CPT | Performed by: PHYSICAL MEDICINE & REHABILITATION

## 2018-03-12 PROCEDURE — G8598 ASA/ANTIPLAT THER USED: HCPCS | Performed by: PHYSICAL MEDICINE & REHABILITATION

## 2018-03-12 PROCEDURE — G8482 FLU IMMUNIZE ORDER/ADMIN: HCPCS | Performed by: PHYSICAL MEDICINE & REHABILITATION

## 2018-03-12 PROCEDURE — G8427 DOCREV CUR MEDS BY ELIG CLIN: HCPCS | Performed by: PHYSICAL MEDICINE & REHABILITATION

## 2018-03-12 PROCEDURE — 1036F TOBACCO NON-USER: CPT | Performed by: PHYSICAL MEDICINE & REHABILITATION

## 2018-03-12 PROCEDURE — 1123F ACP DISCUSS/DSCN MKR DOCD: CPT | Performed by: PHYSICAL MEDICINE & REHABILITATION

## 2018-03-12 PROCEDURE — 4040F PNEUMOC VAC/ADMIN/RCVD: CPT | Performed by: PHYSICAL MEDICINE & REHABILITATION

## 2018-03-12 PROCEDURE — G8419 CALC BMI OUT NRM PARAM NOF/U: HCPCS | Performed by: PHYSICAL MEDICINE & REHABILITATION

## 2018-03-12 RX ORDER — POLYETHYLENE GLYCOL 3350 17 G/17G
17 POWDER, FOR SOLUTION ORAL DAILY
COMMUNITY

## 2018-03-12 NOTE — LETTER
Number of Visits Requested:   1     No orders of the defined types were placed in this encounter. Thank you for allowing me to participate in the care of this patient. I will keep you updated on this patient's follow up and I look forward to serving you and your patients again in the future. Jane Jimenez. Umberto Gonzalez MD

## 2018-03-13 NOTE — PROGRESS NOTES
[facility] & [facility] PHYSICIANS  [facility] PHYSICAL MEDICINE & REHABILITATION  [address] [facility] [address]  Dept: [phone]  Dept Fax: [phone]    Outpatient Follow up Note    Marco Antonio Mujica, [de-identified] y.o., male, presents for follow up post inpt rehab  Chief Complaint   Patient presents with    Follow-up     s/p inpatient rehab due to stroke   . Patient was last seen on 1/24/18    HPI:     HPI  Since discharge he's done fairly well. He has completed home therapies. He is now ambulates with a narrow-base quad cane and occasionally without assistive device. He's had no falls. He denies any difficulty with bowels or bladder. He denies any pain. He is able to do his own ADLs. He denies a difficulty eating and drinking. He is not driving. He is pleased with his progress. He comes in today with his daughter    He has followed up with primary care physician for blood pressure medication adjustments. He still has follow-up with neurologist pending. History  Mr. Antwon Nicholson is a 76 y. o. right handed male who [facility] with Altered Mental Status     38-year-old male who on 1/21/2018 fell and hit his head next day he was noticing decreased balance.  He was taken [facility] on 1/22/2018 for evaluation for possible CVA.  Patient already goes to physical therapy for balance dysfunction. Nancie Benites also has a history of peripheral vascular disease and macular degeneration.       Patient seen by   neurology-MRI  showed 2 right hemispheric infarctions.  Minimal carotid disease.  Started Lipitor and Plavix.  He's noted have continued truncal ataxia. Keila Irizarry was consulted.   Family practice followed     1/22/2018 CT head  No acute intracranial abnormality.       Findings were discussed with SANTHOSH MCNAMARA at 10:04 am on 1/22/2018 1/23/2018 MRI brain  Acute infarcts within the medial right temporal lobe and right occipital lobe.          1/23/2018 carotid  Right:    The internal carotid artery is normal.    The vertebral artery is patent with antegrade flow.        Left:    The internal carotid artery is normal.    The vertebral artery is patent with antegrade flow      1/24/2018 echo  Summary  Left ventricle is normal in size and wall thickness. Global left ventricular systolic function is normal with an estimated  ejection fraction of 55 % . No obvious wall motion abnormality seen. Grade I (mild) left ventricular diastolic dysfunction. Mild mitral regurgitation. Mild tricuspid regurgitation. No significant pericardial effusion is seen    Hospital course  -Continue rehabilitation, discharged home, blood pressure medications adjusted, fluid restriction for hyponatremia was discontinued, advised to discontinue anti-inflammatories    follow up with 1. PCP-Dr. Shaggy Matias 2. Neurology- [address] [place] [address], [place] PT/OT, no driving, CBC/BMP 1 week, recommend 24/7 supervision-above discussed with patient        Past Medical History:   Diagnosis Date    Asthma     COPD (chronic obstructive pulmonary disease) ([facility] [address]     Hypertension       Past Surgical History:   Procedure Laterality Date    ABDOMINAL AORTIC ANEURYSM REPAIR      CARPAL TUNNEL RELEASE Bilateral     COLECTOMY      JOINT REPLACEMENT      left total knee       History reviewed. No pertinent family history.     Social History   Substance Use Topics    Smoking status: Former Smoker    Smokeless tobacco: Former User    Alcohol use No        Current Outpatient Prescriptions   Medication Sig Dispense Refill    aspirin 81 MG tablet Take 81 mg by mouth daily      polyethylene glycol (GLYCOLAX) powder Take 17 g by mouth daily      atorvastatin (LIPITOR) 40 MG tablet Take 1 tablet by mouth nightly 30 tablet 3    losartan (COZAAR) 25 MG tablet Take 1 tablet by mouth 2 times daily 30 tablet 3    carvedilol (COREG) 6.25 MG tablet Take 1 tablet by mouth 2 times daily (with meals) 60 tablet 3    clopidogrel (PLAVIX) 75 MG tablet Take 1 tablet by mouth daily 30 tablet 3    Roflumilast (DALIRESP) 500 MCG tablet Take 500 mcg by mouth daily      ferrous sulfate 325 (65 Fe) MG tablet Take 325 mg by mouth daily (with breakfast)      gabapentin (NEURONTIN) 300 MG capsule Take 300 mg by mouth 2 times daily .  omeprazole (PRILOSEC) 20 MG delayed release capsule Take 20 mg by mouth Daily      tiotropium (SPIRIVA) 18 MCG inhalation capsule Inhale 18 mcg into the lungs daily      budesonide-formoterol (SYMBICORT) 160-4.5 MCG/ACT AERO Inhale 2 puffs into the lungs 2 times daily       No current facility-administered medications for this visit. Allergies   Allergen Reactions    Latex     Morphine Other (See Comments)         Subjective:      Review of Systems  CONSTITUTIONAL: Negative for fever, chills, sweat, fatigue and unexpected weight change. HEENT:  Negative for diplopia, blind spots, blurred vision, hearing loss, trouble chewing or swallowing, denies coughing while eating or drinking denies nosebleed    RESPIRATORY: Negative for wheezing, coughing or shortness of breath    CARDIOVASCULAR: Negative for chest pain, palpitations, lightheadedness  GASTROINTESTINAL: Negative for heartburn, nausea, constipation, diarrhea, abdominal pain  or incontinence  GENTIOURNIARY: Negative for dysuria, urgency, frequency, incontinence and difficulty urinating. ENDOCRINE: Negative for hot or cold intolerance, denies any significant weight change  MUSCULOSKELETAL: Negative for focal joint pain, back pain, neck pain and arthralgias.    NUEROLOGIIC: Negative for focal weakness, numbness, tingling, balance loss, headaches or falls  BEHAVIOR/PSYCY: Denies depression, anxiety, memory loss or insomnia  SKIN: Denies ulcers, rashes or bruises         Objective:     Physical Exam  /67   Pulse 80   Temp 97.7 °F (36.5 °C)   Wt 209 lb (94.8 kg)   BMI 29.59 kg/m²     Nursing notes and vitals reviewed    CONSTITUTIONAL: He is oriented to person,

## 2018-04-16 ENCOUNTER — OFFICE VISIT (OUTPATIENT)
Dept: PHYSICAL MEDICINE AND REHAB | Age: 78
End: 2018-04-16
Payer: MEDICARE

## 2018-04-16 VITALS
BODY MASS INDEX: 29.96 KG/M2 | DIASTOLIC BLOOD PRESSURE: 66 MMHG | TEMPERATURE: 98.3 F | WEIGHT: 211.6 LBS | HEART RATE: 81 BPM | SYSTOLIC BLOOD PRESSURE: 135 MMHG

## 2018-04-16 DIAGNOSIS — I63.511 CEREBROVASCULAR ACCIDENT (CVA) DUE TO OCCLUSION OF RIGHT MIDDLE CEREBRAL ARTERY (HCC): Primary | ICD-10-CM

## 2018-04-16 DIAGNOSIS — I10 ESSENTIAL HYPERTENSION: ICD-10-CM

## 2018-04-16 PROCEDURE — 4040F PNEUMOC VAC/ADMIN/RCVD: CPT | Performed by: PHYSICAL MEDICINE & REHABILITATION

## 2018-04-16 PROCEDURE — 1123F ACP DISCUSS/DSCN MKR DOCD: CPT | Performed by: PHYSICAL MEDICINE & REHABILITATION

## 2018-04-16 PROCEDURE — G8419 CALC BMI OUT NRM PARAM NOF/U: HCPCS | Performed by: PHYSICAL MEDICINE & REHABILITATION

## 2018-04-16 PROCEDURE — G8598 ASA/ANTIPLAT THER USED: HCPCS | Performed by: PHYSICAL MEDICINE & REHABILITATION

## 2018-04-16 PROCEDURE — 99213 OFFICE O/P EST LOW 20 MIN: CPT | Performed by: PHYSICAL MEDICINE & REHABILITATION

## 2018-04-16 PROCEDURE — 1036F TOBACCO NON-USER: CPT | Performed by: PHYSICAL MEDICINE & REHABILITATION

## 2018-04-16 PROCEDURE — G8427 DOCREV CUR MEDS BY ELIG CLIN: HCPCS | Performed by: PHYSICAL MEDICINE & REHABILITATION

## 2018-04-24 ENCOUNTER — HOSPITAL ENCOUNTER (OUTPATIENT)
Dept: NON INVASIVE DIAGNOSTICS | Age: 78
Discharge: HOME | End: 2018-04-24
Payer: MEDICARE

## 2018-04-24 ENCOUNTER — HOSPITAL ENCOUNTER (OUTPATIENT)
Age: 78
Setting detail: OUTPATIENT SURGERY
Discharge: HOME | End: 2018-04-24
Attending: INTERNAL MEDICINE | Admitting: INTERNAL MEDICINE
Payer: MEDICARE

## 2018-04-24 VITALS
BODY MASS INDEX: 29.78 KG/M2 | DIASTOLIC BLOOD PRESSURE: 72 MMHG | OXYGEN SATURATION: 100 % | HEART RATE: 68 BPM | RESPIRATION RATE: 14 BRPM | HEIGHT: 70 IN | SYSTOLIC BLOOD PRESSURE: 156 MMHG | TEMPERATURE: 97.7 F | WEIGHT: 208 LBS

## 2018-04-24 LAB
LV EF: 55 %
LVEF MODALITY: NORMAL

## 2018-04-24 PROCEDURE — 6360000002 HC RX W HCPCS: Performed by: INTERNAL MEDICINE

## 2018-04-24 PROCEDURE — 99152 MOD SED SAME PHYS/QHP 5/>YRS: CPT | Performed by: INTERNAL MEDICINE

## 2018-04-24 PROCEDURE — 6370000000 HC RX 637 (ALT 250 FOR IP): Performed by: INTERNAL MEDICINE

## 2018-04-24 PROCEDURE — 7100000010 HC PHASE II RECOVERY - FIRST 15 MIN: Performed by: INTERNAL MEDICINE

## 2018-04-24 PROCEDURE — 78428 CARDIAC SHUNT IMAGING: CPT

## 2018-04-24 PROCEDURE — 2580000003 HC RX 258: Performed by: INTERNAL MEDICINE

## 2018-04-24 PROCEDURE — 93312 ECHO TRANSESOPHAGEAL: CPT

## 2018-04-24 PROCEDURE — 7100000011 HC PHASE II RECOVERY - ADDTL 15 MIN: Performed by: INTERNAL MEDICINE

## 2018-04-24 PROCEDURE — 3600000012 HC SURGERY LEVEL 2 ADDTL 15MIN: Performed by: INTERNAL MEDICINE

## 2018-04-24 PROCEDURE — 3600000002 HC SURGERY LEVEL 2 BASE: Performed by: INTERNAL MEDICINE

## 2018-04-24 RX ORDER — FENTANYL CITRATE 50 UG/ML
INJECTION, SOLUTION INTRAMUSCULAR; INTRAVENOUS PRN
Status: DISCONTINUED | OUTPATIENT
Start: 2018-04-24 | End: 2018-04-24 | Stop reason: HOSPADM

## 2018-04-24 RX ORDER — SODIUM CHLORIDE 9 MG/ML
INJECTION, SOLUTION INTRAVENOUS CONTINUOUS
Status: DISCONTINUED | OUTPATIENT
Start: 2018-04-24 | End: 2018-04-24 | Stop reason: HOSPADM

## 2018-04-24 RX ADMIN — SODIUM CHLORIDE: 9 INJECTION, SOLUTION INTRAVENOUS at 10:33

## 2018-04-24 ASSESSMENT — PAIN - FUNCTIONAL ASSESSMENT: PAIN_FUNCTIONAL_ASSESSMENT: 0-10

## 2018-04-25 PROCEDURE — 93312 ECHO TRANSESOPHAGEAL: CPT | Performed by: INTERNAL MEDICINE

## 2018-04-25 PROCEDURE — 93325 DOPPLER ECHO COLOR FLOW MAPG: CPT | Performed by: INTERNAL MEDICINE
